# Patient Record
Sex: FEMALE | Race: WHITE | NOT HISPANIC OR LATINO | Employment: OTHER | URBAN - METROPOLITAN AREA
[De-identification: names, ages, dates, MRNs, and addresses within clinical notes are randomized per-mention and may not be internally consistent; named-entity substitution may affect disease eponyms.]

---

## 2017-09-26 ENCOUNTER — APPOINTMENT (OUTPATIENT)
Dept: WOMENS IMAGING | Facility: HOSPITAL | Age: 82
End: 2017-09-26

## 2017-09-26 PROCEDURE — 77063 BREAST TOMOSYNTHESIS BI: CPT | Performed by: RADIOLOGY

## 2017-09-26 PROCEDURE — G0202 SCR MAMMO BI INCL CAD: HCPCS | Performed by: RADIOLOGY

## 2017-09-26 PROCEDURE — MDREVIEWSP: Performed by: RADIOLOGY

## 2018-10-09 ENCOUNTER — APPOINTMENT (OUTPATIENT)
Dept: WOMENS IMAGING | Facility: HOSPITAL | Age: 83
End: 2018-10-09

## 2018-10-09 PROCEDURE — MDREVIEWSP: Performed by: RADIOLOGY

## 2018-10-09 PROCEDURE — 77067 SCR MAMMO BI INCL CAD: CPT | Performed by: RADIOLOGY

## 2018-10-09 PROCEDURE — 77063 BREAST TOMOSYNTHESIS BI: CPT | Performed by: RADIOLOGY

## 2019-07-26 ENCOUNTER — OFFICE VISIT (OUTPATIENT)
Dept: CARDIOLOGY | Facility: CLINIC | Age: 84
End: 2019-07-26

## 2019-07-26 VITALS
WEIGHT: 151 LBS | RESPIRATION RATE: 18 BRPM | DIASTOLIC BLOOD PRESSURE: 80 MMHG | BODY MASS INDEX: 29.64 KG/M2 | SYSTOLIC BLOOD PRESSURE: 144 MMHG | HEART RATE: 52 BPM | HEIGHT: 60 IN | OXYGEN SATURATION: 98 %

## 2019-07-26 DIAGNOSIS — E03.9 ACQUIRED HYPOTHYROIDISM: ICD-10-CM

## 2019-07-26 DIAGNOSIS — R00.2 PALPITATIONS: ICD-10-CM

## 2019-07-26 DIAGNOSIS — R60.0 BILATERAL LEG EDEMA: ICD-10-CM

## 2019-07-26 DIAGNOSIS — R60.0 EDEMA LEG: Primary | ICD-10-CM

## 2019-07-26 PROCEDURE — 93000 ELECTROCARDIOGRAM COMPLETE: CPT | Performed by: INTERNAL MEDICINE

## 2019-07-26 PROCEDURE — 99203 OFFICE O/P NEW LOW 30 MIN: CPT | Performed by: INTERNAL MEDICINE

## 2019-07-26 RX ORDER — LEVOTHYROXINE SODIUM 0.05 MG/1
1 TABLET ORAL DAILY
Refills: 3 | COMMUNITY
Start: 2019-06-27

## 2019-07-26 NOTE — PROGRESS NOTES
Subjective:     Encounter Date:07/26/2019      Patient ID: Jaleesa Govea is a 84 y.o. female.    Chief Complaint:  Chief Complaint   Patient presents with   • Leg Swelling       HPI:  Jaleesa is a very pleasant 84-year-old female with no cardiac history.  She has only history of underlying hypothyroidism for which she takes levothyroxine for thyroid replacement therapy.  She is being referred for evaluation for lower extremity edema.    Her lower extremity edema is not associated with any shortness of breath decrease in physical activity or recent dietary changes.  She reports that her lower extremity edema was noticed in the setting of pretibial tenderness and tingling to touch.  She is now wearing compression stockings.  The edema does resolve with elevation of her legs.  She has no history of peripheral vascular disease.  Of note she also describes palpitations, no heart racing.  Applications occur at rest and seem to resolve with exertion.  There is no associated lightheadedness dizziness and no report of near syncope.  ECG see below.    The following portions of the patient's history were reviewed and updated as appropriate: allergies, current medications, past family history, past medical history, past social history, past surgical history and problem list.    Problem List:  Patient Active Problem List   Diagnosis   • Hypothyroidism   • Bilateral leg edema   • Palpitations       Past Medical History:  Past Medical History:   Diagnosis Date   • Bilateral leg edema 7/26/2019   • Hypothyroidism    • Palpitations 7/26/2019       Past Surgical History:  History reviewed. No pertinent surgical history.    Social History:  Social History     Socioeconomic History   • Marital status:      Spouse name: Not on file   • Number of children: Not on file   • Years of education: Not on file   • Highest education level: Not on file   Tobacco Use   • Smoking status: Former Smoker   • Smokeless tobacco: Never Used  "  Substance and Sexual Activity   • Alcohol use: No     Frequency: Never   • Drug use: No       Allergies:  Allergies   Allergen Reactions   • Penicillins Hives   • Sulfa Antibiotics Confusion       Review of Symptoms:  Constitutional: Patient afebrile no chills or unexpected weight changes  Respiratory: No cough, no wheezing or dyspnea  Cardiovascular: No chest pain, palpitations, dyspnea, orthopnea and no edema  Gastrointestinal: No nausea, vomiting, constipation or diarrhea.  No melena or dark stools    All other systems reviewed and are negative           Objective:         /80 (BP Location: Left arm, Patient Position: Sitting, Cuff Size: Large Adult)   Pulse 52   Resp 18   Ht 152.4 cm (60\")   Wt 68.5 kg (151 lb)   SpO2 98%   BMI 29.49 kg/m²       Physical exam  Constitutional: well-nourished, and appears stated age in no acute distress  PERRL: Conjunctiva clear, no pallor, anicteric  HENMT: normocephalic, normal dentition, no cyanosis or pallor  Neck:no bruits, or thrills and bilateral normal carotid upstroke. Normal jugular venous pressure  Cardiovascular: No parasternal heaves an non-displaced focal PMI. Normal rate and rhythm: no rub, gallop, murmur or click and normal S1 and S2; no lower or upper extremity edema.   Lungs: unlabored, no wheezing with no rales or rhonchi on auscultation.  Extremities: Warm, no clubbing, cyanosis, or edema. Full and equal peripheral pulses in extremities with no bruits appreciated.   Abdomen: soft, non-tender, non-distended  Musculoskeletal: no joint tenderness or swelling and no erythema  Skin: Warm and dry, non-erythematous   Neuro:alert and normal affect. Oriented to time, place and person.        In-Office Procedure(s):    ECG 12 Lead  Date/Time: 7/26/2019 9:45 AM  Performed by: Miguel Foster MD  Authorized by: Miguel Foster MD   Rhythm: sinus rhythm  Comments: Multiple atrial premature contractions with right bundle branch " block            ASCVD RIsk Score::  The ASCVD Risk score (Mileyjose VILLALBA Jr., et al., 2013) failed to calculate for the following reasons:    The 2013 ASCVD risk score is only valid for ages 40 to 79    Recent Radiology:  Imaging Results (most recent)     None          Lab Review:   No visits with results within 2 Month(s) from this visit.   Latest known visit with results is:   Admission on 06/15/2017, Discharged on 06/15/2017   Component Date Value   • Specimen Description: 06/15/2017 RIGHT LOWER LEG    • Special Requests 06/15/2017 NO SPECIAL REQUESTS    • Gram Stain Result 06/15/2017 NO ORGANISMS SEENFEW WBCS SEEN    • Culture 06/15/2017 NO GROWTH 4 DAYS    • Report Status 06/15/2017 06/19/2017 FINAL               Invalid input(s): ALKPO4                        Invalid input(s): LDLCALC                Assessment:          Diagnosis Plan   1. Edema leg  Adult Transthoracic Echo Complete W/ Cont if Necessary Per Protocol    ECG 12 Lead   2. Palpitations     3. Acquired hypothyroidism     4. Bilateral leg edema            Plan:         1. Edema leg is a new complaint to me  Rule out structural heart disease.  - Adult Transthoracic Echo Complete W/ Cont if Necessary Per Protocol; Future    2.  Palpitations this is a new complaint to me  Given her EKG, it is likely that these multiple APCs are the etiology to her palpitations.  She denies any racing heart or symptomatic lightheadedness with her palpitations.  Echo as above to rule out structural heart disease.    3. Chest pain  Likely secondary to palpitations.  Echo will elucidate.    4. Hypothyroidism  May be a controlling factor to her lower extremity edema as well; namely pretibial edema    Greater than 60 minutes of face-to-face time the patient, of which greater than 50% was spent counseling specifically discussing other etiologies for lower extremity edema namely venous insufficiency.    Level of Care:                 Miguel Foster MD  07/26/19  .

## 2019-07-30 ENCOUNTER — HOSPITAL ENCOUNTER (OUTPATIENT)
Dept: CARDIOLOGY | Facility: HOSPITAL | Age: 84
Discharge: HOME | End: 2019-07-30
Admitting: INTERNAL MEDICINE

## 2019-07-30 VITALS — BODY MASS INDEX: 24.11 KG/M2 | WEIGHT: 150 LBS | HEIGHT: 66 IN

## 2019-07-30 DIAGNOSIS — R60.0 EDEMA LEG: ICD-10-CM

## 2019-07-30 PROCEDURE — 93306 TTE W/DOPPLER COMPLETE: CPT

## 2019-07-30 PROCEDURE — 93306 TTE W/DOPPLER COMPLETE: CPT | Performed by: INTERNAL MEDICINE

## 2019-08-01 LAB
BH CV ECHO MEAS - ACS: 1.8 CM
BH CV ECHO MEAS - AO MAX PG (FULL): 2.1 MMHG
BH CV ECHO MEAS - AO MAX PG: 9.3 MMHG
BH CV ECHO MEAS - AO MEAN PG (FULL): 0.95 MMHG
BH CV ECHO MEAS - AO MEAN PG: 3.7 MMHG
BH CV ECHO MEAS - AO ROOT AREA (BSA CORRECTED): 1.7
BH CV ECHO MEAS - AO ROOT AREA: 6.9 CM^2
BH CV ECHO MEAS - AO ROOT DIAM: 3 CM
BH CV ECHO MEAS - AO V2 MAX: 152.4 CM/SEC
BH CV ECHO MEAS - AO V2 MEAN: 87.2 CM/SEC
BH CV ECHO MEAS - AO V2 VTI: 28.5 CM
BH CV ECHO MEAS - AORTIC HR: 90.6 BPM
BH CV ECHO MEAS - AORTIC R-R: 0.66 SEC
BH CV ECHO MEAS - ASC AORTA: 2.3 CM
BH CV ECHO MEAS - AVA(I,A): 2.8 CM^2
BH CV ECHO MEAS - AVA(I,D): 2.8 CM^2
BH CV ECHO MEAS - AVA(V,A): 2.7 CM^2
BH CV ECHO MEAS - AVA(V,D): 2.7 CM^2
BH CV ECHO MEAS - BSA(HAYCOCK): 1.8 M^2
BH CV ECHO MEAS - BSA: 1.8 M^2
BH CV ECHO MEAS - BZI_BMI: 24.2 KILOGRAMS/M^2
BH CV ECHO MEAS - BZI_METRIC_HEIGHT: 167.6 CM
BH CV ECHO MEAS - BZI_METRIC_WEIGHT: 68 KG
BH CV ECHO MEAS - CI(AO): 10 L/MIN/M^2
BH CV ECHO MEAS - CI(LVOT): 4 L/MIN/M^2
BH CV ECHO MEAS - CO(AO): 17.7 L/MIN
BH CV ECHO MEAS - CO(LVOT): 7.1 L/MIN
BH CV ECHO MEAS - EDV(CUBED): 104.6 ML
BH CV ECHO MEAS - EDV(MOD-SP4): 89.2 ML
BH CV ECHO MEAS - EDV(TEICH): 103 ML
BH CV ECHO MEAS - EF(CUBED): 83.9 %
BH CV ECHO MEAS - EF(MOD-BP): 57 %
BH CV ECHO MEAS - EF(MOD-SP4): 57.4 %
BH CV ECHO MEAS - EF(TEICH): 76.9 %
BH CV ECHO MEAS - ESV(CUBED): 16.9 ML
BH CV ECHO MEAS - ESV(MOD-SP4): 38 ML
BH CV ECHO MEAS - ESV(TEICH): 23.8 ML
BH CV ECHO MEAS - FS: 45.6 %
BH CV ECHO MEAS - IVS/LVPW: 0.94
BH CV ECHO MEAS - IVSD: 0.79 CM
BH CV ECHO MEAS - LA DIMENSION(2D): 3.8 CM
BH CV ECHO MEAS - LV DIASTOLIC VOL/BSA (35-75): 50.4 ML/M^2
BH CV ECHO MEAS - LV MASS(C)D: 125.5 GRAMS
BH CV ECHO MEAS - LV MASS(C)DI: 70.9 GRAMS/M^2
BH CV ECHO MEAS - LV MAX PG: 7.2 MMHG
BH CV ECHO MEAS - LV MEAN PG: 2.7 MMHG
BH CV ECHO MEAS - LV SYSTOLIC VOL/BSA (12-30): 21.5 ML/M^2
BH CV ECHO MEAS - LV V1 MAX: 134.4 CM/SEC
BH CV ECHO MEAS - LV V1 MEAN: 71.5 CM/SEC
BH CV ECHO MEAS - LV V1 VTI: 26.1 CM
BH CV ECHO MEAS - LVIDD: 4.7 CM
BH CV ECHO MEAS - LVIDS: 2.6 CM
BH CV ECHO MEAS - LVOT AREA: 3 CM^2
BH CV ECHO MEAS - LVOT DIAM: 2 CM
BH CV ECHO MEAS - LVPWD: 0.84 CM
BH CV ECHO MEAS - MR MAX PG: 125.5 MMHG
BH CV ECHO MEAS - MR MAX VEL: 560.1 CM/SEC
BH CV ECHO MEAS - MV A MAX VEL: 63.7 CM/SEC
BH CV ECHO MEAS - MV DEC SLOPE: 374.7 CM/SEC^2
BH CV ECHO MEAS - MV DEC TIME: 0.21 SEC
BH CV ECHO MEAS - MV E MAX VEL: 78 CM/SEC
BH CV ECHO MEAS - MV E/A: 1.2
BH CV ECHO MEAS - MV MAX PG: 3.2 MMHG
BH CV ECHO MEAS - MV MEAN PG: 1.2 MMHG
BH CV ECHO MEAS - MV V2 MAX: 89.4 CM/SEC
BH CV ECHO MEAS - MV V2 MEAN: 50.9 CM/SEC
BH CV ECHO MEAS - MV V2 VTI: 43.9 CM
BH CV ECHO MEAS - MVA(VTI): 1.8 CM^2
BH CV ECHO MEAS - PA ACC TIME: 0.14 SEC
BH CV ECHO MEAS - PA MAX PG (FULL): 2.5 MMHG
BH CV ECHO MEAS - PA MAX PG: 4.1 MMHG
BH CV ECHO MEAS - PA MEAN PG (FULL): 1 MMHG
BH CV ECHO MEAS - PA MEAN PG: 1.8 MMHG
BH CV ECHO MEAS - PA PR(ACCEL): 15.6 MMHG
BH CV ECHO MEAS - PA V2 MAX: 101.9 CM/SEC
BH CV ECHO MEAS - PA V2 MEAN: 63 CM/SEC
BH CV ECHO MEAS - PA V2 VTI: 19.7 CM
BH CV ECHO MEAS - PULM A REVS DUR: 0.19 SEC
BH CV ECHO MEAS - PULM A REVS VEL: 37.1 CM/SEC
BH CV ECHO MEAS - PULM DIAS VEL: 46.4 CM/SEC
BH CV ECHO MEAS - PULM S/D: 1.3
BH CV ECHO MEAS - PULM SYS VEL: 58.6 CM/SEC
BH CV ECHO MEAS - PVA(I,A): 2.6 CM^2
BH CV ECHO MEAS - PVA(I,D): 2.6 CM^2
BH CV ECHO MEAS - PVA(V,A): 2.2 CM^2
BH CV ECHO MEAS - PVA(V,D): 2.2 CM^2
BH CV ECHO MEAS - QP/QS: 0.66
BH CV ECHO MEAS - RAP SYSTOLE: 3 MMHG
BH CV ECHO MEAS - RV MAX PG: 1.6 MMHG
BH CV ECHO MEAS - RV MEAN PG: 0.81 MMHG
BH CV ECHO MEAS - RV V1 MAX: 63.7 CM/SEC
BH CV ECHO MEAS - RV V1 MEAN: 41 CM/SEC
BH CV ECHO MEAS - RV V1 VTI: 15 CM
BH CV ECHO MEAS - RVDD: 1.7 CM
BH CV ECHO MEAS - RVOT AREA: 3.5 CM^2
BH CV ECHO MEAS - RVOT DIAM: 2.1 CM
BH CV ECHO MEAS - RVSP: 40.2 MMHG
BH CV ECHO MEAS - SI(AO): 110.6 ML/M^2
BH CV ECHO MEAS - SI(CUBED): 49.6 ML/M^2
BH CV ECHO MEAS - SI(LVOT): 44.5 ML/M^2
BH CV ECHO MEAS - SI(MOD-SP4): 29 ML/M^2
BH CV ECHO MEAS - SI(TEICH): 44.7 ML/M^2
BH CV ECHO MEAS - SV(AO): 195.7 ML
BH CV ECHO MEAS - SV(CUBED): 87.7 ML
BH CV ECHO MEAS - SV(LVOT): 78.8 ML
BH CV ECHO MEAS - SV(MOD-SP4): 51.3 ML
BH CV ECHO MEAS - SV(RVOT): 51.9 ML
BH CV ECHO MEAS - SV(TEICH): 79.2 ML
BH CV ECHO MEAS - TR MAX VEL: 304.9 CM/SEC
MAXIMAL PREDICTED HEART RATE: 138 BPM
STRESS TARGET HR: 117 BPM

## 2019-08-02 ENCOUNTER — TELEPHONE (OUTPATIENT)
Dept: CARDIOLOGY | Facility: CLINIC | Age: 84
End: 2019-08-02

## 2019-08-02 NOTE — TELEPHONE ENCOUNTER
LMOM for pt to return my call for results of echo. Study was normal. No significant VHD and heart function is normal. Carolin

## 2019-08-19 ENCOUNTER — TELEPHONE (OUTPATIENT)
Dept: CARDIOLOGY | Facility: CLINIC | Age: 84
End: 2019-08-19

## 2019-08-19 NOTE — TELEPHONE ENCOUNTER
Patient states she is a patient of Dr Foster, Wants to know if he wants her to see another doctor. States she has spoke with you previous but he was out of the office.    [phone]

## 2019-10-10 ENCOUNTER — APPOINTMENT (OUTPATIENT)
Dept: WOMENS IMAGING | Facility: HOSPITAL | Age: 84
End: 2019-10-10

## 2019-10-10 PROCEDURE — 77067 SCR MAMMO BI INCL CAD: CPT | Performed by: RADIOLOGY

## 2019-10-10 PROCEDURE — MDREVIEWSP: Performed by: RADIOLOGY

## 2019-10-10 PROCEDURE — 77063 BREAST TOMOSYNTHESIS BI: CPT | Performed by: RADIOLOGY

## 2019-12-03 ENCOUNTER — HOSPITAL ENCOUNTER (EMERGENCY)
Facility: HOSPITAL | Age: 84
Discharge: HOME | End: 2019-12-03
Attending: EMERGENCY MEDICINE | Admitting: EMERGENCY MEDICINE

## 2019-12-03 ENCOUNTER — HOSPITAL ENCOUNTER (EMERGENCY)
Dept: CARDIOLOGY | Facility: HOSPITAL | Age: 84
Discharge: HOME | End: 2019-12-03

## 2019-12-03 VITALS
HEIGHT: 66 IN | TEMPERATURE: 97 F | SYSTOLIC BLOOD PRESSURE: 146 MMHG | DIASTOLIC BLOOD PRESSURE: 80 MMHG | HEART RATE: 80 BPM | BODY MASS INDEX: 24.06 KG/M2 | OXYGEN SATURATION: 100 % | WEIGHT: 149.69 LBS | RESPIRATION RATE: 20 BRPM

## 2019-12-03 DIAGNOSIS — M79.604 PAIN IN BOTH LOWER EXTREMITIES: ICD-10-CM

## 2019-12-03 DIAGNOSIS — M79.605 PAIN IN BOTH LOWER EXTREMITIES: ICD-10-CM

## 2019-12-03 DIAGNOSIS — M54.9 ACUTE BILATERAL BACK PAIN, UNSPECIFIED BACK LOCATION: Primary | ICD-10-CM

## 2019-12-03 LAB
ALBUMIN SERPL-MCNC: 4.1 G/DL (ref 3.5–5.2)
ALBUMIN/GLOB SERPL: 1 G/DL
ALP SERPL-CCNC: 87 U/L (ref 39–117)
ALT SERPL W P-5'-P-CCNC: 15 U/L (ref 1–33)
ANION GAP SERPL CALCULATED.3IONS-SCNC: 13 MMOL/L (ref 5–15)
AST SERPL-CCNC: 20 U/L (ref 1–32)
BASOPHILS # BLD AUTO: 0 10*3/MM3 (ref 0–0.2)
BASOPHILS NFR BLD AUTO: 0.3 % (ref 0–1.5)
BH CV LOWER ARTERIAL LEFT ABI RATIO: 1.03
BH CV LOWER ARTERIAL LEFT DORSALIS PEDIS SYS MAX: 178 MMHG
BH CV LOWER ARTERIAL LEFT GREAT TOE SYS MAX: 117 MMHG
BH CV LOWER ARTERIAL LEFT POST TIBIAL SYS MAX: 178 MMHG
BH CV LOWER ARTERIAL LEFT TBI RATIO: 0.68
BH CV LOWER ARTERIAL RIGHT ABI RATIO: 1.06
BH CV LOWER ARTERIAL RIGHT DORSALIS PEDIS SYS MAX: 179 MMHG
BH CV LOWER ARTERIAL RIGHT GREAT TOE SYS MAX: 121 MMHG
BH CV LOWER ARTERIAL RIGHT POST TIBIAL SYS MAX: 183 MMHG
BH CV LOWER ARTERIAL RIGHT TBI RATIO: 0.7
BILIRUB SERPL-MCNC: 0.4 MG/DL (ref 0.2–1.2)
BILIRUB UR QL STRIP: NEGATIVE
BUN BLD-MCNC: 16 MG/DL (ref 8–23)
BUN/CREAT SERPL: 17.4 (ref 7–25)
CALCIUM SPEC-SCNC: 9.4 MG/DL (ref 8.6–10.5)
CHLORIDE SERPL-SCNC: 99 MMOL/L (ref 98–107)
CLARITY UR: CLEAR
CO2 SERPL-SCNC: 26 MMOL/L (ref 22–29)
COLOR UR: YELLOW
CREAT BLD-MCNC: 0.92 MG/DL (ref 0.57–1)
DEPRECATED RDW RBC AUTO: 44.6 FL (ref 37–54)
EOSINOPHIL # BLD AUTO: 0 10*3/MM3 (ref 0–0.4)
EOSINOPHIL NFR BLD AUTO: 0.6 % (ref 0.3–6.2)
ERYTHROCYTE [DISTWIDTH] IN BLOOD BY AUTOMATED COUNT: 13.4 % (ref 12.3–15.4)
GFR SERPL CREATININE-BSD FRML MDRD: 58 ML/MIN/1.73
GLOBULIN UR ELPH-MCNC: 4.1 GM/DL
GLUCOSE BLD-MCNC: 111 MG/DL (ref 65–99)
GLUCOSE UR STRIP-MCNC: NEGATIVE MG/DL
HCT VFR BLD AUTO: 42.9 % (ref 34–46.6)
HGB BLD-MCNC: 14.5 G/DL (ref 12–15.9)
HGB UR QL STRIP.AUTO: NEGATIVE
KETONES UR QL STRIP: ABNORMAL
LEUKOCYTE ESTERASE UR QL STRIP.AUTO: NEGATIVE
LYMPHOCYTES # BLD AUTO: 0.8 10*3/MM3 (ref 0.7–3.1)
LYMPHOCYTES NFR BLD AUTO: 14.3 % (ref 19.6–45.3)
MCH RBC QN AUTO: 31.7 PG (ref 26.6–33)
MCHC RBC AUTO-ENTMCNC: 33.7 G/DL (ref 31.5–35.7)
MCV RBC AUTO: 94 FL (ref 79–97)
MONOCYTES # BLD AUTO: 0.5 10*3/MM3 (ref 0.1–0.9)
MONOCYTES NFR BLD AUTO: 8.2 % (ref 5–12)
NEUTROPHILS # BLD AUTO: 4.3 10*3/MM3 (ref 1.7–7)
NEUTROPHILS NFR BLD AUTO: 76.6 % (ref 42.7–76)
NITRITE UR QL STRIP: NEGATIVE
NRBC BLD AUTO-RTO: 0.3 /100 WBC (ref 0–0.2)
PH UR STRIP.AUTO: 6 [PH] (ref 5–8)
PLATELET # BLD AUTO: 216 10*3/MM3 (ref 140–450)
PMV BLD AUTO: 9.1 FL (ref 6–12)
POTASSIUM BLD-SCNC: 4.1 MMOL/L (ref 3.5–5.2)
PROT SERPL-MCNC: 8.2 G/DL (ref 6–8.5)
PROT UR QL STRIP: NEGATIVE
RBC # BLD AUTO: 4.57 10*6/MM3 (ref 3.77–5.28)
SODIUM BLD-SCNC: 138 MMOL/L (ref 136–145)
SP GR UR STRIP: 1.02 (ref 1–1.03)
UPPER ARTERIAL LEFT ARM BRACHIAL SYS MAX: 165 MMHG
UPPER ARTERIAL RIGHT ARM BRACHIAL SYS MAX: 172 MMHG
UROBILINOGEN UR QL STRIP: ABNORMAL
WBC NRBC COR # BLD: 5.7 10*3/MM3 (ref 3.4–10.8)

## 2019-12-03 PROCEDURE — 93922 UPR/L XTREMITY ART 2 LEVELS: CPT

## 2019-12-03 PROCEDURE — 81003 URINALYSIS AUTO W/O SCOPE: CPT | Performed by: EMERGENCY MEDICINE

## 2019-12-03 PROCEDURE — 99283 EMERGENCY DEPT VISIT LOW MDM: CPT

## 2019-12-03 PROCEDURE — 85025 COMPLETE CBC W/AUTO DIFF WBC: CPT | Performed by: EMERGENCY MEDICINE

## 2019-12-03 PROCEDURE — 80053 COMPREHEN METABOLIC PANEL: CPT | Performed by: EMERGENCY MEDICINE

## 2019-12-03 NOTE — ED PROVIDER NOTES
Subjective   History of Present Illness  Context: 84-year-old female presents with complaints of weakness.  She states she had pain across the middle of her back during the night.  It is worse if she moves or tries to rollover.  She states she is just not felt as well the last few days.  She had an abscess on her back was placed on Keflex for her.  She states with warm compresses it did pop and drain.  She states she has not felt as well since she has been on the antibiotic.  She has had no cough no chest pain or shortness of breath.  No vomit no diarrhea no dysuria.  She complains of soreness in her legs if they are touched.  She states her doctor had referred her to a vascular surgeon but she has not seen yet.  She reports no fever.  She does not complain of any significant abdominal pain at this time.  States he has just a little bit of soreness.  Location: Abdomen back legs  Quality: Soreness  Duration: Back today other soreness more chronic  Timing: Back pain improved at this time  Severity: Severe earlier mild now   Modifying factors: Worse with movement.  States MARÍA hose helped her leg symptoms  Associated signs and symptoms: On Keflex for abscess to back    Review of Systems  For fever chest pain shortness of breath vomiting diarrhea dysuria  Past Medical History:   Diagnosis Date   • Bilateral leg edema 7/26/2019   • Hypothyroidism    • Palpitations 7/26/2019       Allergies   Allergen Reactions   • Penicillins Hives   • Sulfa Antibiotics Confusion       No past surgical history on file.  Hip replacement bilateral knee replacements  Family History   Problem Relation Age of Onset   • Coronary artery disease Brother        Social History     Socioeconomic History   • Marital status:      Spouse name: Not on file   • Number of children: Not on file   • Years of education: Not on file   • Highest education level: Not on file   Tobacco Use   • Smoking status: Former Smoker   • Smokeless tobacco: Never  Used   Substance and Sexual Activity   • Alcohol use: No     Frequency: Never   • Drug use: No     Prior to Admission medications    Medication Sig Start Date End Date Taking? Authorizing Provider   levothyroxine (SYNTHROID, LEVOTHROID) 50 MCG tablet Take 1 tablet by mouth Daily. 6/27/19   Provider, Ramu, MD   Keflex        Objective   Physical Exam   ,84-year-old female awake alert.  Generally well-developed well-nourished.  Pupils equal round react light.  Oropharynx mucous membranes moist chest clear equal breath sounds.  Cardiovascular regular rhythm.  Abdomen soft without localizing tenderness mass rebound or guarding.  Examination of back no CVA tenderness.  She has no difficulty with movement.  Examination of legs she has some discoloration to the lower legs consistent with some venous insufficiency.  There is no varicose veins there is no edema.  She is neurovascular grossly intact distally.  Examination of back abscess appears to have healed without complication  Patient does complain that she has pain with light touch of legs bilaterally.  She has no significant varicose veins.  She has negative Homans.  No calf edema.  Procedures           ED Course      Results for orders placed or performed during the hospital encounter of 12/03/19   Comprehensive Metabolic Panel   Result Value Ref Range    Glucose 111 (H) 65 - 99 mg/dL    BUN 16 8 - 23 mg/dL    Creatinine 0.92 0.57 - 1.00 mg/dL    Sodium 138 136 - 145 mmol/L    Potassium 4.1 3.5 - 5.2 mmol/L    Chloride 99 98 - 107 mmol/L    CO2 26.0 22.0 - 29.0 mmol/L    Calcium 9.4 8.6 - 10.5 mg/dL    Total Protein 8.2 6.0 - 8.5 g/dL    Albumin 4.10 3.50 - 5.20 g/dL    ALT (SGPT) 15 1 - 33 U/L    AST (SGOT) 20 1 - 32 U/L    Alkaline Phosphatase 87 39 - 117 U/L    Total Bilirubin 0.4 0.2 - 1.2 mg/dL    eGFR Non African Amer 58 (L) >60 mL/min/1.73    Globulin 4.1 gm/dL    A/G Ratio 1.0 g/dL    BUN/Creatinine Ratio 17.4 7.0 - 25.0    Anion Gap 13.0 5.0 - 15.0  mmol/L   Urinalysis With Microscopic If Indicated (No Culture) - Urine, Clean Catch   Result Value Ref Range    Color, UA Yellow Yellow, Straw    Appearance, UA Clear Clear    pH, UA 6.0 5.0 - 8.0    Specific Gravity, UA 1.022 1.005 - 1.030    Glucose, UA Negative Negative    Ketones, UA Trace (A) Negative    Bilirubin, UA Negative Negative    Blood, UA Negative Negative    Protein, UA Negative Negative    Leuk Esterase, UA Negative Negative    Nitrite, UA Negative Negative    Urobilinogen, UA 0.2 E.U./dL 0.2 - 1.0 E.U./dL   CBC Auto Differential   Result Value Ref Range    WBC 5.70 3.40 - 10.80 10*3/mm3    RBC 4.57 3.77 - 5.28 10*6/mm3    Hemoglobin 14.5 12.0 - 15.9 g/dL    Hematocrit 42.9 34.0 - 46.6 %    MCV 94.0 79.0 - 97.0 fL    MCH 31.7 26.6 - 33.0 pg    MCHC 33.7 31.5 - 35.7 g/dL    RDW 13.4 12.3 - 15.4 %    RDW-SD 44.6 37.0 - 54.0 fl    MPV 9.1 6.0 - 12.0 fL    Platelets 216 140 - 450 10*3/mm3    Neutrophil % 76.6 (H) 42.7 - 76.0 %    Lymphocyte % 14.3 (L) 19.6 - 45.3 %    Monocyte % 8.2 5.0 - 12.0 %    Eosinophil % 0.6 0.3 - 6.2 %    Basophil % 0.3 0.0 - 1.5 %    Neutrophils, Absolute 4.30 1.70 - 7.00 10*3/mm3    Lymphocytes, Absolute 0.80 0.70 - 3.10 10*3/mm3    Monocytes, Absolute 0.50 0.10 - 0.90 10*3/mm3    Eosinophils, Absolute 0.00 0.00 - 0.40 10*3/mm3    Basophils, Absolute 0.00 0.00 - 0.20 10*3/mm3    nRBC 0.3 (H) 0.0 - 0.2 /100 WBC   Doppler Ankle Brachial Index Single Level CAR   Result Value Ref Range    RIGHT DORSALIS PEDIS SYS  mmHg    RIGHT POST TIBIAL SYS  mmHg    RIGHT GREAT TOE SYS  mmHg    RIGHT JAS RATIO 1.06     RIGHT TBI RATIO 0.7     LEFT DORSALIS PEDIS SYS  mmHg    LEFT POST TIBIAL SYS  mmHg    LEFT GREAT TOE SYS  mmHg    LEFT JAS RATIO 1.03     LEFT TBI RATIO 0.68     Upper arterial right arm brachial sys max 172 mmHg    Upper arterial left arm brachial sys max 165 mmHg     No radiology results for the last day  Medications - No data to  "display  /95   Pulse 87   Temp 97.8 °F (36.6 °C) (Oral)   Resp 19   Ht 167.6 cm (66\")   Wt 67.9 kg (149 lb 11.1 oz)   SpO2 99%   BMI 24.16 kg/m²                MDM  Chart review: Patient had echocardiogram earlier this year that showed mild LV diastolic dysfunction.  LV systolic function was noted to be normal.  Comorbidity: Joint replacements hypothyroid  Differential: UTI, musculoskeletal pain, no evidence of intra-abdominal abnormality based on benign physical exam.  Peripheral vascular disease  My EKG interpretation:   Lab: No significant laboratory abnormality  Radiology: Ankle-brachial indices found to be normal  Discussion/treatment: Patient's findings were discussed with her.  She has unremarkable findings at this time.  She did not desire any medication for pain states would use Tylenol and Aleve.  Advised to follow-up with her PMD return new or worsening symptoms.  The pain in her legs being sensitive to light touch sounds like she has some neuropathic pain.    Final diagnoses:   Acute bilateral back pain, unspecified back location   Pain in both lower extremities             Gabriel Rubio MD  12/03/19 1201    Gabriel Rubio MD  12/03/19 1202  "

## 2019-12-03 NOTE — ED NOTES
Pt reports that she was seen at [facility] for a skin infection after a friend had popped a white bump on her back that then formed a knot and she was placed on 10 days of antibiotics, this morning she woke up with very low back pain denies urinary s/sx says that utis are common for her and she usually does not have s/sx.     Reports general weakness and severe lower back pain  States she has chronic leg pain and was referred to a vascular surgeon but she has not went because her luis hoses have been working well. Pt presents with bilateral leg soreness with palpitated   Geri Herron, RN  12/03/19 1038       Geri Herron RN  12/03/19 1047

## 2021-03-19 ENCOUNTER — HOSPITAL ENCOUNTER (EMERGENCY)
Facility: HOSPITAL | Age: 86
Discharge: HOME | End: 2021-03-19
Attending: EMERGENCY MEDICINE | Admitting: EMERGENCY MEDICINE

## 2021-03-19 VITALS
HEIGHT: 66 IN | TEMPERATURE: 97.9 F | SYSTOLIC BLOOD PRESSURE: 145 MMHG | WEIGHT: 153.22 LBS | OXYGEN SATURATION: 98 % | BODY MASS INDEX: 24.62 KG/M2 | RESPIRATION RATE: 16 BRPM | HEART RATE: 64 BPM | DIASTOLIC BLOOD PRESSURE: 78 MMHG

## 2021-03-19 DIAGNOSIS — S81.812A NONINFECTED SKIN TEAR OF LEFT LOWER EXTREMITY, INITIAL ENCOUNTER: Primary | ICD-10-CM

## 2021-03-19 PROCEDURE — 99282 EMERGENCY DEPT VISIT SF MDM: CPT

## 2021-03-19 NOTE — DISCHARGE INSTRUCTIONS
Elevate your left leg today  Tylenol as needed for discomfort  Watch for evidence of infection  Glue will gradually flake off

## 2021-03-19 NOTE — ED NOTES
Presents with skin tear to left shin. States she was getting something on the washer and a stool fell on her leg.     Reina Smith, RN  03/19/21 0726

## 2021-03-19 NOTE — ED PROVIDER NOTES
Subjective   85-year-old female sustained a laceration to her left lower extremity when it was hit with a plastic chair while she was doing laundry.  She reports a large skin flap.  She reports no decrease in sensation or circulation.  The patient denies other injuries.  Patient states her tetanus status was up-to-date as she had a Tdap vaccination so she could be around her grandchildren          Review of Systems   Musculoskeletal: Positive for arthralgias. Negative for back pain and neck pain.   Skin: Positive for wound.   All other systems reviewed and are negative.      Past Medical History:   Diagnosis Date   • Bilateral leg edema 7/26/2019   • History of echocardiogram 07/30/2019    EF 57%, Mild MR/TR/AR/AR, Grade 2 diastolic dysfunction   • Hypothyroidism    • Palpitations 7/26/2019       Allergies   Allergen Reactions   • Sulfamethoxazole-Trimethoprim Anaphylaxis   • Trimethoprim Other (See Comments)     UNCONSCIOUS    • Cephalexin Other (See Comments)     lethargic   • Nitrofurantoin Dizziness     Feeling faint   • Penicillins Hives   • Sulfa Antibiotics Confusion   • Morphine Nausea Only     Severe nausea   • Warfarin Itching and Rash     ITCHING          History reviewed. No pertinent surgical history.    Family History   Problem Relation Age of Onset   • Coronary artery disease Brother        Social History     Socioeconomic History   • Marital status:      Spouse name: Not on file   • Number of children: Not on file   • Years of education: Not on file   • Highest education level: Not on file   Tobacco Use   • Smoking status: Former Smoker   • Smokeless tobacco: Never Used   Substance and Sexual Activity   • Alcohol use: No   • Drug use: No           Objective   Physical Exam  Nontoxic Jakin Coma Scale 15  Left lower extremity neurovascular intact with normal cap refill no for range of motion intact there is a V-shaped large skin flap measuring 5 cm along the long axis and and 2 cm along the  shorter axis.  There is no evidence of compartment syndrome patient is neurovascular intact Crowell's test is normal  Procedures       The wound was prepped with chlorhexidine irrigated with saline tissue adhesive was used to tack down the flap.    ED Course                                           MDM  Number of Diagnoses or Management Options  Risk of Complications, Morbidity, and/or Mortality  Presenting problems: high  Diagnostic procedures: high  Management options: high  General comments: Patient was advised on wound care instructions at home.  She will elevate the leg today.  The patient was stable at discharge and vocalized understanding of discharge instructions and warnings        Final diagnoses:   Noninfected skin tear of left lower extremity, initial encounter       ED Disposition  ED Disposition     ED Disposition Condition Comment    Discharge Stable           Mariluz Barajas MD  [address] [place] [address]  [phone]               Medication List      No changes were made to your prescriptions during this visit.          Lenin Forde MD  03/19/21 7084

## 2021-03-20 ENCOUNTER — HOSPITAL ENCOUNTER (EMERGENCY)
Facility: HOSPITAL | Age: 86
Discharge: HOME | End: 2021-03-20
Admitting: EMERGENCY MEDICINE

## 2021-03-20 VITALS
WEIGHT: 152.12 LBS | SYSTOLIC BLOOD PRESSURE: 139 MMHG | DIASTOLIC BLOOD PRESSURE: 80 MMHG | OXYGEN SATURATION: 100 % | HEART RATE: 85 BPM | HEIGHT: 66 IN | RESPIRATION RATE: 18 BRPM | TEMPERATURE: 97.9 F | BODY MASS INDEX: 24.45 KG/M2

## 2021-03-20 DIAGNOSIS — S81.812A SKIN TEAR OF LEFT LOWER LEG WITHOUT COMPLICATION, INITIAL ENCOUNTER: Primary | ICD-10-CM

## 2021-03-20 DIAGNOSIS — Z51.89 VISIT FOR WOUND CHECK: ICD-10-CM

## 2021-03-20 PROCEDURE — 99282 EMERGENCY DEPT VISIT SF MDM: CPT

## 2021-03-20 PROCEDURE — 99283 EMERGENCY DEPT VISIT LOW MDM: CPT

## 2021-03-20 NOTE — ED NOTES
Dry sterile nonadherent dressing applied covered with large tegaderm and wrapped in eileen. Pt tolerated well no active bleeding noted     Jennifer Nation RN  03/20/21 4307

## 2021-03-20 NOTE — ED NOTES
Pt was seen here yesterday in ED for skin tear of left lower leg. Presents today with c/o wound bleeding and requests wound be re- evaluated.     Jennifer Nation RN  03/20/21 9689

## 2021-03-20 NOTE — ED PROVIDER NOTES
Subjective   History:  Patient is an 85-year-old female who presents to the ER for a wound check.  Patient was seen here yesterday for a large skin tear.  This morning when she went to shower and took off the dressing the dressing tore some of the skin back open and she was concerned about the bleeding.  Denies any fevers chills nausea vomiting denies any significant pain.    Onset: 1 day  Location: Left lower extremity  Duration: Constant  Character: Skin tear with bleeding  Aggravating/Alleviating factors: None  Radiation none  Severity: Moderate            Review of Systems   Constitutional: Negative for chills, diaphoresis, fatigue and fever.   Respiratory: Negative for cough, choking and shortness of breath.    Cardiovascular: Negative for chest pain and palpitations.   Gastrointestinal: Negative for abdominal pain, nausea and vomiting.   Genitourinary: Negative.    Musculoskeletal: Negative.    Skin: Positive for wound. Negative for color change, pallor and rash.   Neurological: Negative.    Psychiatric/Behavioral: Negative.        Past Medical History:   Diagnosis Date   • Bilateral leg edema 7/26/2019   • History of echocardiogram 07/30/2019    EF 57%, Mild MR/TR/AR/AR, Grade 2 diastolic dysfunction   • Hypothyroidism    • Palpitations 7/26/2019       Allergies   Allergen Reactions   • Sulfamethoxazole-Trimethoprim Anaphylaxis   • Trimethoprim Other (See Comments)     UNCONSCIOUS    • Cephalexin Other (See Comments)     lethargic   • Nitrofurantoin Dizziness     Feeling faint   • Penicillins Hives   • Sulfa Antibiotics Confusion   • Morphine Nausea Only     Severe nausea   • Warfarin Itching and Rash     ITCHING          No past surgical history on file.    Family History   Problem Relation Age of Onset   • Coronary artery disease Brother        Social History     Socioeconomic History   • Marital status:      Spouse name: Not on file   • Number of children: Not on file   • Years of education: Not on  file   • Highest education level: Not on file   Tobacco Use   • Smoking status: Former Smoker   • Smokeless tobacco: Never Used   Substance and Sexual Activity   • Alcohol use: No   • Drug use: No           Objective   Physical Exam  Vitals and nursing note reviewed.   Constitutional:       Appearance: She is well-developed.   HENT:      Head: Normocephalic and atraumatic.      Nose: Nose normal.   Eyes:      Pupils: Pupils are equal, round, and reactive to light.   Pulmonary:      Effort: Pulmonary effort is normal.   Musculoskeletal:         General: Normal range of motion.      Cervical back: Normal range of motion.   Skin:     General: Skin is warm and dry.      Comments: Left anterior lower leg has large skin tear approximately 2x5.5cm. Very minimal active bleeding noted. Dried skin glue noted. Caprefill normal. Pulses 2+ in LLE   Neurological:      General: No focal deficit present.      Mental Status: She is alert and oriented to person, place, and time. Mental status is at baseline.   Psychiatric:         Mood and Affect: Mood normal.         Behavior: Behavior normal.         Thought Content: Thought content normal.         Judgment: Judgment normal.         Procedures           ED Course          No radiology results for the last day  Labs Reviewed - No data to display  Medications - No data to display                                    MDM  Number of Diagnoses or Management Options  Skin tear of left lower leg without complication, initial encounter  Visit for wound check  Diagnosis management comments: I examined the patient using the appropriate personal protective equipment.      DISPOSITION:   Chart Review:  Comorbidity:  has a past medical history of Bilateral leg edema (7/26/2019), History of echocardiogram (07/30/2019), Hypothyroidism, and Palpitations (7/26/2019).    Imaging: Was interpreted by physician and reviewed by myself:  No radiology results for the last  day    Disposition/Treatment:    Patient is a 85-year-old female who presents to the ER for a recheck of a skin tear.  This appears stable no signs of infection with no erythema or edema noted around the incision.  This was redressed with a nonadhesive dressing and patient was given extra to go home with.  She was stable at time of discharge return precaution follow-up return provided she was stable and in agreement with plan.    Patient Progress  Patient progress: stable      Final diagnoses:   Skin tear of left lower leg without complication, initial encounter   Visit for wound check       ED Disposition  ED Disposition     ED Disposition Condition Comment    Discharge Stable           Mariluz Barajas MD  [address] [address]  [phone]    Schedule an appointment as soon as possible for a visit in 3 days  For wound re-check         Medication List      No changes were made to your prescriptions during this visit.          Jennifer Hayward PA-C  03/20/21 4909

## 2021-03-20 NOTE — DISCHARGE INSTRUCTIONS
Return to the ER for any worsening symptoms follow-up with your primary care provider for wound recheck in 3 to 5 days.  May wash with antibacterial soap twice daily.  Otherwise keep clean dry and covered for the next 48 hours.

## 2021-03-20 NOTE — ED NOTES
Patient presents to ED with reports that she was seen yesterday for a wound on her left leg that had liquid bandage applied yesterday in the ED. She was told to remove the bandage today and when she attempted to, she reports that the wound began to bleed again. Bleeding is controlled in triage.      Alla Christopher, RN  03/20/21 0900

## 2021-04-07 ENCOUNTER — OFFICE VISIT (OUTPATIENT)
Dept: WOUND CARE | Facility: HOSPITAL | Age: 86
End: 2021-04-07

## 2021-04-07 DIAGNOSIS — L97.929 CHRONIC VENOUS HYPERTENSION (IDIOPATHIC) WITH ULCER OF LEFT LOWER EXTREMITY (HCC): ICD-10-CM

## 2021-04-07 DIAGNOSIS — I87.312 CHRONIC VENOUS HYPERTENSION (IDIOPATHIC) WITH ULCER OF LEFT LOWER EXTREMITY (HCC): ICD-10-CM

## 2021-04-07 DIAGNOSIS — L97.822 NON-PRESSURE CHRONIC ULCER OF OTHER PART OF LEFT LOWER LEG WITH FAT LAYER EXPOSED (HCC): ICD-10-CM

## 2021-04-07 PROCEDURE — 11045 DBRDMT SUBQ TISS EACH ADDL: CPT | Performed by: PODIATRIST

## 2021-04-07 PROCEDURE — 11042 DBRDMT SUBQ TIS 1ST 20SQCM/<: CPT | Performed by: PODIATRIST

## 2021-04-09 ENCOUNTER — OFFICE VISIT (OUTPATIENT)
Dept: WOUND CARE | Facility: HOSPITAL | Age: 86
End: 2021-04-09

## 2021-04-14 ENCOUNTER — LAB REQUISITION (OUTPATIENT)
Dept: LAB | Facility: HOSPITAL | Age: 86
End: 2021-04-14

## 2021-04-14 ENCOUNTER — OFFICE VISIT (OUTPATIENT)
Dept: WOUND CARE | Facility: HOSPITAL | Age: 86
End: 2021-04-14

## 2021-04-14 DIAGNOSIS — I87.312 CHRONIC VENOUS HYPERTENSION (IDIOPATHIC) WITH ULCER OF LEFT LOWER EXTREMITY (CODE) (HCC): ICD-10-CM

## 2021-04-14 DIAGNOSIS — L97.822 NON-PRESSURE CHRONIC ULCER OF OTHER PART OF LEFT LOWER LEG WITH FAT LAYER EXPOSED (HCC): ICD-10-CM

## 2021-04-14 DIAGNOSIS — L03.116 CELLULITIS OF LEFT LOWER LEG: ICD-10-CM

## 2021-04-14 PROCEDURE — 11042 DBRDMT SUBQ TIS 1ST 20SQCM/<: CPT | Performed by: PODIATRIST

## 2021-04-14 PROCEDURE — 99213 OFFICE O/P EST LOW 20 MIN: CPT | Performed by: PODIATRIST

## 2021-04-14 PROCEDURE — 87205 SMEAR GRAM STAIN: CPT | Performed by: SURGERY

## 2021-04-14 PROCEDURE — 87070 CULTURE OTHR SPECIMN AEROBIC: CPT | Performed by: SURGERY

## 2021-04-16 ENCOUNTER — OFFICE VISIT (OUTPATIENT)
Dept: WOUND CARE | Facility: HOSPITAL | Age: 86
End: 2021-04-16

## 2021-04-19 LAB
BACTERIA SPEC AEROBE CULT: NORMAL
GRAM STN SPEC: NORMAL
GRAM STN SPEC: NORMAL

## 2021-04-21 ENCOUNTER — OFFICE VISIT (OUTPATIENT)
Dept: WOUND CARE | Facility: HOSPITAL | Age: 86
End: 2021-04-21

## 2021-04-21 DIAGNOSIS — I87.312 CHRONIC VENOUS HYPERTENSION (IDIOPATHIC) WITH ULCER OF LEFT LOWER EXTREMITY (CODE) (HCC): ICD-10-CM

## 2021-04-21 DIAGNOSIS — L97.822 NON-PRESSURE CHRONIC ULCER OF OTHER PART OF LEFT LOWER LEG WITH FAT LAYER EXPOSED (HCC): ICD-10-CM

## 2021-04-21 PROCEDURE — 99213 OFFICE O/P EST LOW 20 MIN: CPT | Performed by: PODIATRIST

## 2021-04-23 ENCOUNTER — OFFICE VISIT (OUTPATIENT)
Dept: WOUND CARE | Facility: HOSPITAL | Age: 86
End: 2021-04-23

## 2021-04-28 ENCOUNTER — OFFICE VISIT (OUTPATIENT)
Dept: WOUND CARE | Facility: HOSPITAL | Age: 86
End: 2021-04-28

## 2021-04-28 DIAGNOSIS — I87.312 CHRONIC VENOUS HYPERTENSION (IDIOPATHIC) WITH ULCER OF LEFT LOWER EXTREMITY (CODE) (HCC): ICD-10-CM

## 2021-04-28 DIAGNOSIS — L97.822 NON-PRESSURE CHRONIC ULCER OF OTHER PART OF LEFT LOWER LEG WITH FAT LAYER EXPOSED (HCC): ICD-10-CM

## 2021-04-28 PROCEDURE — 99213 OFFICE O/P EST LOW 20 MIN: CPT | Performed by: PODIATRIST

## 2021-04-30 ENCOUNTER — OFFICE VISIT (OUTPATIENT)
Dept: WOUND CARE | Facility: HOSPITAL | Age: 86
End: 2021-04-30

## 2021-05-05 ENCOUNTER — OFFICE VISIT (OUTPATIENT)
Dept: WOUND CARE | Facility: HOSPITAL | Age: 86
End: 2021-05-05

## 2021-05-05 DIAGNOSIS — I87.312 CHRONIC VENOUS HYPERTENSION (IDIOPATHIC) WITH ULCER OF LEFT LOWER EXTREMITY (CODE) (HCC): ICD-10-CM

## 2021-05-05 DIAGNOSIS — L97.822 NON-PRESSURE CHRONIC ULCER OF OTHER PART OF LEFT LOWER LEG WITH FAT LAYER EXPOSED (HCC): ICD-10-CM

## 2021-05-05 PROCEDURE — 99212 OFFICE O/P EST SF 10 MIN: CPT | Performed by: PODIATRIST

## 2021-05-05 PROCEDURE — G0463 HOSPITAL OUTPT CLINIC VISIT: HCPCS

## 2021-05-07 ENCOUNTER — TELEPHONE (OUTPATIENT)
Dept: ORTHOPEDIC SURGERY | Facility: CLINIC | Age: 86
End: 2021-05-07

## 2021-05-07 NOTE — TELEPHONE ENCOUNTER
Caller: KALEE COREAS     Relationship to patient: SELF     Best call back number: [phone]     Type of visit: FOLLOW UP AFTER AFTER BEING SEEN IN WOUND CARE     Requested date: AROUND THE WEEK OF 05/24/21    Additional notes: PATIENT STATES THAT SHE HAS BEEN SEEN IN WOUND CARE WITH DR BAINS AND THAT DR BAINS REQUESTED FOR HER TO SCHEDULE A FOLLOW UP IN HIS PODIATRY OFFICE, PATIENT STATES THAT IT IS A 2 WEEK FOLLOW UP, LAST SEEN 05/05/21 AND PATIENT ALSO HAS CATARACT SX COMING UP SO SHE CAN NOT COME IN UNTIL THE WEEK OF THE 24TH

## 2021-05-26 ENCOUNTER — OFFICE VISIT (OUTPATIENT)
Dept: PODIATRY | Facility: CLINIC | Age: 86
End: 2021-05-26

## 2021-05-26 VITALS
SYSTOLIC BLOOD PRESSURE: 164 MMHG | BODY MASS INDEX: 24.27 KG/M2 | HEIGHT: 66 IN | WEIGHT: 151 LBS | HEART RATE: 84 BPM | DIASTOLIC BLOOD PRESSURE: 82 MMHG

## 2021-05-26 DIAGNOSIS — S81.812D LACERATION OF LEFT LOWER EXTREMITY, SUBSEQUENT ENCOUNTER: Primary | ICD-10-CM

## 2021-05-26 PROCEDURE — 99212 OFFICE O/P EST SF 10 MIN: CPT | Performed by: PODIATRIST

## 2021-05-26 RX ORDER — ASCORBIC ACID 500 MG
500 TABLET ORAL DAILY
COMMUNITY
End: 2022-08-03

## 2021-05-26 RX ORDER — DIPHENOXYLATE HYDROCHLORIDE AND ATROPINE SULFATE 2.5; .025 MG/1; MG/1
1 TABLET ORAL DAILY
COMMUNITY

## 2021-05-26 RX ORDER — LANOLIN ALCOHOL/MO/W.PET/CERES
1 CREAM (GRAM) TOPICAL
COMMUNITY

## 2021-05-26 RX ORDER — LEVOTHYROXINE SODIUM 50 UG/1
50 CAPSULE ORAL
COMMUNITY
End: 2022-04-16 | Stop reason: HOSPADM

## 2021-05-26 RX ORDER — OMEGA-3S/DHA/EPA/FISH OIL/D3 300MG-1000
400 CAPSULE ORAL DAILY
COMMUNITY

## 2021-05-26 RX ORDER — BENZOCAINE 20 %
GEL (GRAM) MUCOUS MEMBRANE
COMMUNITY

## 2021-05-26 RX ORDER — OMEGA-3/DHA/EPA/FISH OIL 300-1000MG
CAPSULE ORAL DAILY
COMMUNITY
End: 2022-08-03

## 2021-05-27 NOTE — PROGRESS NOTES
05/26/2021  Foot and Ankle Surgery - Established Patient/Follow-up  Provider: Dr. Harsha Coronado DPM  Location: [facility]    Subjective:  Jaleesa Govea is a 85 y.o. female.     Chief Complaint   Patient presents with   • Left Ankle - Wound Check       HPI: Patient follows up after recent discharge from the wound care center.  She states that she is doing quite well.  The laceration site to the left lower extremity is well-healed.  She has continued to apply a bandage to protect from rubbing on her close but has not noticed any drainage or other concerning features.  Patient is very happy with her overall improvement.    Allergies   Allergen Reactions   • Sulfamethoxazole-Trimethoprim Anaphylaxis   • Trimethoprim Other (See Comments)     UNCONSCIOUS    • Cephalexin Other (See Comments)     lethargic   • Nitrofurantoin Dizziness     Feeling faint   • Penicillins Hives   • Sulfa Antibiotics Confusion   • Morphine Nausea Only     Severe nausea   • Warfarin Itching and Rash     ITCHING          Current Outpatient Medications on File Prior to Visit   Medication Sig Dispense Refill   • ascorbic acid (VITAMIN C) 500 MG tablet Take 500 mg by mouth Daily.     • Calcium Carbonate 1500 (600 Ca) MG tablet Take 600 mg by mouth Daily.     • cholecalciferol (VITAMIN D3) 10 MCG (400 UNIT) tablet Take 400 Units by mouth Daily.     • fish oil-omega-3 fatty acids 1000 MG capsule Take  by mouth Daily.     • Flaxseed, Linseed, 1000 MG capsule Take  by mouth.     • glucosamine-chondroitin 500-400 MG per tablet Take 1 tablet by mouth.     • levothyroxine (SYNTHROID, LEVOTHROID) 50 MCG tablet Take 1 tablet by mouth Daily.  3   • levothyroxine sodium (TIROSINT) 50 MCG capsule Take 50 mcg by mouth.     • multivitamin (multivitamin) tablet tablet Take 1 tablet by mouth Daily.     • zinc sulfate (ZINCATE) 50 MG capsule Take 220 mg by mouth Daily.       No current facility-administered medications on file prior to visit.  "      Objective   /82   Pulse 84   Ht 167.6 cm (66\")   Wt 68.5 kg (151 lb)   BMI 24.37 kg/m²     Foot/Ankle Exam:       General:   Appearance: appears stated age and healthy    Orientation: AAOx3    Affect: appropriate    Gait: unimpaired      VASCULAR      Left Foot Vascularity   Normal vascular exam    Dorsalis pedis:  2+  Posterior tibial:  2+  Skin Temperature: warm    Edema Gradin+  CFT:  < 3 seconds  Pedal Hair Growth:  Present  Varicosities: none        NEUROLOGIC     Left Foot Neurologic   Light touch sensation:  Normal  Hot/cold sensation: normal    Achilles reflex:  2+     MUSCULOSKELETAL      Left Foot Musculoskeletal   Ecchymosis:  None  Tenderness: none    Arch:  Normal     MUSCLE STRENGTH     Left Foot Muscle Strength   Normal strength    Foot dorsiflexion:  5  Foot plantar flexion:  5  Foot inversion:  5  Foot eversion:  5     DERMATOLOGIC     Left Foot Dermatologic   Skin: skin intact       TESTS     Left Foot Tests   Anterior drawer: negative    Varus tilt: negative        Assessment/Plan   Diagnoses and all orders for this visit:    1. Laceration of left lower extremity, subsequent encounter (Primary)      Laceration to the pretibial region of the left lower extremity remains well-healed.  She continues to have mild swelling but no complicating features.  I have no further instructions or restrictions for her at this time.  She may proceed with normal daily activities as tolerated.  Patient is to see me on an as-needed basis.    No orders of the defined types were placed in this encounter.         Note is dictated utilizing voice recognition software. Unfortunately this leads to occasional typographical errors. I apologize in advance if the situation occurs. If questions occur please do not hesitate to call our office.  "

## 2021-06-17 ENCOUNTER — OFFICE VISIT (OUTPATIENT)
Dept: PODIATRY | Facility: CLINIC | Age: 86
End: 2021-06-17

## 2021-06-17 VITALS
WEIGHT: 151 LBS | DIASTOLIC BLOOD PRESSURE: 88 MMHG | HEIGHT: 66 IN | BODY MASS INDEX: 24.27 KG/M2 | HEART RATE: 81 BPM | SYSTOLIC BLOOD PRESSURE: 159 MMHG

## 2021-06-17 DIAGNOSIS — I87.322 CHRONIC VENOUS HYPERTENSION WITH INFLAMMATION, LEFT: Primary | ICD-10-CM

## 2021-06-17 PROCEDURE — 99213 OFFICE O/P EST LOW 20 MIN: CPT | Performed by: PODIATRIST

## 2021-06-17 NOTE — PROGRESS NOTES
06/17/2021  Foot and Ankle Surgery - Established Patient/Follow-up  Provider: Dr. Harsha Coronado DPM  Location: [facility]    Subjective:  Jaleesa Govea is a 85 y.o. female.     Chief Complaint   Patient presents with   • Left Lower Leg - Wound Check       HPI: Patient returns for new issue involving her left lower extremity.  She states that she was watering some flowers and dropped a hose landing on the anterior aspect of her left lower leg.  She did notice a small blood blister which appears to be quite stable.  She states that the lesion has not changed dramatically.  She has not had any redness, drainage, or other complicating features.  Injury occurred 1 week ago    Allergies   Allergen Reactions   • Sulfamethoxazole-Trimethoprim Anaphylaxis   • Trimethoprim Other (See Comments)     UNCONSCIOUS    • Cephalexin Other (See Comments)     lethargic   • Nitrofurantoin Dizziness     Feeling faint   • Penicillins Hives   • Sulfa Antibiotics Confusion   • Morphine Nausea Only     Severe nausea   • Warfarin Itching and Rash     ITCHING          Current Outpatient Medications on File Prior to Visit   Medication Sig Dispense Refill   • ascorbic acid (VITAMIN C) 500 MG tablet Take 500 mg by mouth Daily.     • Calcium Carbonate 1500 (600 Ca) MG tablet Take 600 mg by mouth Daily.     • cholecalciferol (VITAMIN D3) 10 MCG (400 UNIT) tablet Take 400 Units by mouth Daily.     • fish oil-omega-3 fatty acids 1000 MG capsule Take  by mouth Daily.     • Flaxseed, Linseed, 1000 MG capsule Take  by mouth.     • glucosamine-chondroitin 500-400 MG per tablet Take 1 tablet by mouth.     • levothyroxine (SYNTHROID, LEVOTHROID) 50 MCG tablet Take 1 tablet by mouth Daily.  3   • levothyroxine sodium (TIROSINT) 50 MCG capsule Take 50 mcg by mouth.     • multivitamin (multivitamin) tablet tablet Take 1 tablet by mouth Daily.     • zinc sulfate (ZINCATE) 50 MG capsule Take 220 mg by mouth Daily.       No current  "facility-administered medications on file prior to visit.       Objective   /88   Pulse 81   Ht 167.6 cm (66\")   Wt 68.5 kg (151 lb)   BMI 24.37 kg/m²     General:   Appearance: appears stated age and healthy    Orientation: AAOx3    Affect: appropriate    Gait: unimpaired       VASCULAR       Left Foot Vascularity   Normal vascular exam    Dorsalis pedis:  2+  Posterior tibial:  2+  Skin Temperature: warm    Edema Gradin+  CFT:  < 3 seconds  Pedal Hair Growth:  Present  Varicosities: none        NEUROLOGIC      Left Foot Neurologic   Light touch sensation:  Normal  Hot/cold sensation: normal    Achilles reflex:  2+      MUSCULOSKELETAL       Left Foot Musculoskeletal   Ecchymosis:  None  Tenderness: none    Arch:  Normal      MUSCLE STRENGTH      Left Foot Muscle Strength   Normal strength    Foot dorsiflexion:  5  Foot plantar flexion:  5  Foot inversion:  5  Foot eversion:  5      DERMATOLOGIC      Left Foot Dermatologic   Skin: Stable eschar formation to the anterior aspect of the left lower leg consistent with recent injury.  No signs of infection.  No drainage.        TESTS      Left Foot Tests   Anterior drawer: negative    Varus tilt: negative      Assessment/Plan   Diagnoses and all orders for this visit:    1. Chronic venous hypertension with inflammation, left (Primary)      Patient returns after new issue involving the left lower extremity.  She does have a small and stable eschar formation involving the pretibial region.  No complicating features are noted and I do feel that the wound is mostly healed up at this time.  I have advised her to monitor closely and I would like her to wear the over-the-counter compression stockings on a daily basis.  We did review proper use and effects.  She is to monitor closely and call with any new issues.  I would like to see her in 2 weeks for reevaluation    No orders of the defined types were placed in this encounter.         Note is dictated utilizing " voice recognition software. Unfortunately this leads to occasional typographical errors. I apologize in advance if the situation occurs. If questions occur please do not hesitate to call our office.

## 2022-04-08 ENCOUNTER — TRANSCRIBE ORDERS (OUTPATIENT)
Dept: ADMINISTRATIVE | Facility: HOSPITAL | Age: 87
End: 2022-04-08

## 2022-04-08 DIAGNOSIS — I51.89 DIASTOLIC DYSFUNCTION: Primary | ICD-10-CM

## 2022-04-15 ENCOUNTER — HOSPITAL ENCOUNTER (OUTPATIENT)
Facility: HOSPITAL | Age: 87
Setting detail: OBSERVATION
Discharge: HOME | End: 2022-04-16
Attending: EMERGENCY MEDICINE | Admitting: INTERNAL MEDICINE

## 2022-04-15 ENCOUNTER — APPOINTMENT (OUTPATIENT)
Dept: CARDIOLOGY | Facility: HOSPITAL | Age: 87
End: 2022-04-15

## 2022-04-15 ENCOUNTER — APPOINTMENT (OUTPATIENT)
Dept: GENERAL RADIOLOGY | Facility: HOSPITAL | Age: 87
End: 2022-04-15

## 2022-04-15 DIAGNOSIS — R53.1 WEAKNESS: ICD-10-CM

## 2022-04-15 DIAGNOSIS — I82.461 ACUTE DEEP VEIN THROMBOSIS (DVT) OF CALF MUSCLE VEIN OF RIGHT LOWER EXTREMITY: Primary | ICD-10-CM

## 2022-04-15 DIAGNOSIS — Z20.822 COVID-19 VIRUS NOT DETECTED: ICD-10-CM

## 2022-04-15 LAB
ALBUMIN SERPL-MCNC: 3.9 G/DL (ref 3.5–5.2)
ALBUMIN/GLOB SERPL: 1.2 G/DL
ALP SERPL-CCNC: 74 U/L (ref 39–117)
ALT SERPL W P-5'-P-CCNC: 13 U/L (ref 1–33)
ANION GAP SERPL CALCULATED.3IONS-SCNC: 12 MMOL/L (ref 5–15)
APTT PPP: 26.2 SECONDS (ref 61–76.5)
AST SERPL-CCNC: 19 U/L (ref 1–32)
B PARAPERT DNA SPEC QL NAA+PROBE: NOT DETECTED
B PERT DNA SPEC QL NAA+PROBE: NOT DETECTED
BACTERIA UR QL AUTO: ABNORMAL /HPF
BASOPHILS # BLD AUTO: 0 10*3/MM3 (ref 0–0.2)
BASOPHILS NFR BLD AUTO: 0.8 % (ref 0–1.5)
BH CV LOW VAS RIGHT GASTRONEMIUS VESSEL: 1
BH CV LOWER VASCULAR LEFT COMMON FEMORAL AUGMENT: NORMAL
BH CV LOWER VASCULAR LEFT COMMON FEMORAL COMPETENT: NORMAL
BH CV LOWER VASCULAR LEFT COMMON FEMORAL COMPRESS: NORMAL
BH CV LOWER VASCULAR LEFT COMMON FEMORAL PHASIC: NORMAL
BH CV LOWER VASCULAR LEFT COMMON FEMORAL SPONT: NORMAL
BH CV LOWER VASCULAR LEFT DISTAL FEMORAL COMPRESS: NORMAL
BH CV LOWER VASCULAR LEFT GASTRONEMIUS COMPRESS: NORMAL
BH CV LOWER VASCULAR LEFT GREATER SAPH AK COMPRESS: NORMAL
BH CV LOWER VASCULAR LEFT GREATER SAPH BK COMPRESS: NORMAL
BH CV LOWER VASCULAR LEFT LESSER SAPH COMPRESS: NORMAL
BH CV LOWER VASCULAR LEFT MID FEMORAL AUGMENT: NORMAL
BH CV LOWER VASCULAR LEFT MID FEMORAL COMPETENT: NORMAL
BH CV LOWER VASCULAR LEFT MID FEMORAL COMPRESS: NORMAL
BH CV LOWER VASCULAR LEFT MID FEMORAL PHASIC: NORMAL
BH CV LOWER VASCULAR LEFT MID FEMORAL SPONT: NORMAL
BH CV LOWER VASCULAR LEFT PERONEAL COMPRESS: NORMAL
BH CV LOWER VASCULAR LEFT POPLITEAL AUGMENT: NORMAL
BH CV LOWER VASCULAR LEFT POPLITEAL COMPETENT: NORMAL
BH CV LOWER VASCULAR LEFT POPLITEAL COMPRESS: NORMAL
BH CV LOWER VASCULAR LEFT POPLITEAL PHASIC: NORMAL
BH CV LOWER VASCULAR LEFT POPLITEAL SPONT: NORMAL
BH CV LOWER VASCULAR LEFT POSTERIOR TIBIAL COMPRESS: NORMAL
BH CV LOWER VASCULAR LEFT PROXIMAL FEMORAL COMPRESS: NORMAL
BH CV LOWER VASCULAR LEFT SAPHENOFEMORAL JUNCTION COMPRESS: NORMAL
BH CV LOWER VASCULAR RIGHT COMMON FEMORAL AUGMENT: NORMAL
BH CV LOWER VASCULAR RIGHT COMMON FEMORAL COMPETENT: NORMAL
BH CV LOWER VASCULAR RIGHT COMMON FEMORAL COMPRESS: NORMAL
BH CV LOWER VASCULAR RIGHT COMMON FEMORAL PHASIC: NORMAL
BH CV LOWER VASCULAR RIGHT COMMON FEMORAL SPONT: NORMAL
BH CV LOWER VASCULAR RIGHT DISTAL FEMORAL COMPRESS: NORMAL
BH CV LOWER VASCULAR RIGHT GASTRONEMIUS COMPRESS: NORMAL
BH CV LOWER VASCULAR RIGHT GASTRONEMIUS THROMBUS: NORMAL
BH CV LOWER VASCULAR RIGHT GREATER SAPH AK COMPRESS: NORMAL
BH CV LOWER VASCULAR RIGHT GREATER SAPH BK COMPRESS: NORMAL
BH CV LOWER VASCULAR RIGHT LESSER SAPH COMPRESS: NORMAL
BH CV LOWER VASCULAR RIGHT MID FEMORAL AUGMENT: NORMAL
BH CV LOWER VASCULAR RIGHT MID FEMORAL COMPETENT: NORMAL
BH CV LOWER VASCULAR RIGHT MID FEMORAL COMPRESS: NORMAL
BH CV LOWER VASCULAR RIGHT MID FEMORAL PHASIC: NORMAL
BH CV LOWER VASCULAR RIGHT MID FEMORAL SPONT: NORMAL
BH CV LOWER VASCULAR RIGHT PERONEAL COMPRESS: NORMAL
BH CV LOWER VASCULAR RIGHT POPLITEAL AUGMENT: NORMAL
BH CV LOWER VASCULAR RIGHT POPLITEAL COMPETENT: NORMAL
BH CV LOWER VASCULAR RIGHT POPLITEAL COMPRESS: NORMAL
BH CV LOWER VASCULAR RIGHT POPLITEAL PHASIC: NORMAL
BH CV LOWER VASCULAR RIGHT POPLITEAL SPONT: NORMAL
BH CV LOWER VASCULAR RIGHT POSTERIOR TIBIAL COMPRESS: NORMAL
BH CV LOWER VASCULAR RIGHT PROXIMAL FEMORAL COMPRESS: NORMAL
BH CV LOWER VASCULAR RIGHT SAPHENOFEMORAL JUNCTION COMPRESS: NORMAL
BILIRUB SERPL-MCNC: 0.4 MG/DL (ref 0–1.2)
BILIRUB UR QL STRIP: NEGATIVE
BILIRUB UR QL STRIP: NEGATIVE
BUN SERPL-MCNC: 18 MG/DL (ref 8–23)
BUN/CREAT SERPL: 23.1 (ref 7–25)
C PNEUM DNA NPH QL NAA+NON-PROBE: NOT DETECTED
CALCIUM SPEC-SCNC: 9.3 MG/DL (ref 8.6–10.5)
CHLORIDE SERPL-SCNC: 101 MMOL/L (ref 98–107)
CLARITY UR: CLEAR
CLARITY UR: CLEAR
CO2 SERPL-SCNC: 27 MMOL/L (ref 22–29)
COLOR UR: YELLOW
COLOR UR: YELLOW
CREAT SERPL-MCNC: 0.78 MG/DL (ref 0.57–1)
DEPRECATED RDW RBC AUTO: 45.1 FL (ref 37–54)
EGFRCR SERPLBLD CKD-EPI 2021: 75 ML/MIN/1.73
EOSINOPHIL # BLD AUTO: 0.1 10*3/MM3 (ref 0–0.4)
EOSINOPHIL NFR BLD AUTO: 2.3 % (ref 0.3–6.2)
ERYTHROCYTE [DISTWIDTH] IN BLOOD BY AUTOMATED COUNT: 13.8 % (ref 12.3–15.4)
FLUAV SUBTYP SPEC NAA+PROBE: NOT DETECTED
FLUBV RNA ISLT QL NAA+PROBE: NOT DETECTED
GLOBULIN UR ELPH-MCNC: 3.2 GM/DL
GLUCOSE BLDC GLUCOMTR-MCNC: 93 MG/DL (ref 70–105)
GLUCOSE SERPL-MCNC: 101 MG/DL (ref 65–99)
GLUCOSE UR STRIP-MCNC: NEGATIVE MG/DL
GLUCOSE UR STRIP-MCNC: NEGATIVE MG/DL
HADV DNA SPEC NAA+PROBE: NOT DETECTED
HCOV 229E RNA SPEC QL NAA+PROBE: NOT DETECTED
HCOV HKU1 RNA SPEC QL NAA+PROBE: NOT DETECTED
HCOV NL63 RNA SPEC QL NAA+PROBE: NOT DETECTED
HCOV OC43 RNA SPEC QL NAA+PROBE: NOT DETECTED
HCT VFR BLD AUTO: 41.7 % (ref 34–46.6)
HGB BLD-MCNC: 14.4 G/DL (ref 12–15.9)
HGB UR QL STRIP.AUTO: NEGATIVE
HGB UR QL STRIP.AUTO: NEGATIVE
HMPV RNA NPH QL NAA+NON-PROBE: NOT DETECTED
HPIV1 RNA ISLT QL NAA+PROBE: NOT DETECTED
HPIV2 RNA SPEC QL NAA+PROBE: NOT DETECTED
HPIV3 RNA NPH QL NAA+PROBE: NOT DETECTED
HPIV4 P GENE NPH QL NAA+PROBE: NOT DETECTED
HYALINE CASTS UR QL AUTO: ABNORMAL /LPF
INR PPP: 1.43 (ref 0.93–1.1)
KETONES UR QL STRIP: ABNORMAL
KETONES UR QL STRIP: ABNORMAL
LEUKOCYTE ESTERASE UR QL STRIP.AUTO: ABNORMAL
LEUKOCYTE ESTERASE UR QL STRIP.AUTO: NEGATIVE
LYMPHOCYTES # BLD AUTO: 0.8 10*3/MM3 (ref 0.7–3.1)
LYMPHOCYTES NFR BLD AUTO: 22.5 % (ref 19.6–45.3)
M PNEUMO IGG SER IA-ACNC: NOT DETECTED
MAGNESIUM SERPL-MCNC: 1.9 MG/DL (ref 1.6–2.4)
MAXIMAL PREDICTED HEART RATE: 136 BPM
MCH RBC QN AUTO: 31.8 PG (ref 26.6–33)
MCHC RBC AUTO-ENTMCNC: 34.5 G/DL (ref 31.5–35.7)
MCV RBC AUTO: 92.3 FL (ref 79–97)
MONOCYTES # BLD AUTO: 0.4 10*3/MM3 (ref 0.1–0.9)
MONOCYTES NFR BLD AUTO: 10.2 % (ref 5–12)
NEUTROPHILS NFR BLD AUTO: 2.3 10*3/MM3 (ref 1.7–7)
NEUTROPHILS NFR BLD AUTO: 64.2 % (ref 42.7–76)
NITRITE UR QL STRIP: NEGATIVE
NITRITE UR QL STRIP: NEGATIVE
NRBC BLD AUTO-RTO: 0.1 /100 WBC (ref 0–0.2)
NT-PROBNP SERPL-MCNC: 140 PG/ML (ref 0–1800)
PH UR STRIP.AUTO: 6 [PH] (ref 5–8)
PH UR STRIP.AUTO: 6 [PH] (ref 5–8)
PLATELET # BLD AUTO: 167 10*3/MM3 (ref 140–450)
PMV BLD AUTO: 9.4 FL (ref 6–12)
POTASSIUM SERPL-SCNC: 4.5 MMOL/L (ref 3.5–5.2)
PROT SERPL-MCNC: 7.1 G/DL (ref 6–8.5)
PROT UR QL STRIP: NEGATIVE
PROT UR QL STRIP: NEGATIVE
PROTHROMBIN TIME: 14.4 SECONDS (ref 9.6–11.7)
RBC # BLD AUTO: 4.52 10*6/MM3 (ref 3.77–5.28)
RBC # UR STRIP: ABNORMAL /HPF
REF LAB TEST METHOD: ABNORMAL
RHINOVIRUS RNA SPEC NAA+PROBE: NOT DETECTED
RSV RNA NPH QL NAA+NON-PROBE: NOT DETECTED
SARS-COV-2 RNA NPH QL NAA+NON-PROBE: NOT DETECTED
SODIUM SERPL-SCNC: 140 MMOL/L (ref 136–145)
SP GR UR STRIP: 1.01 (ref 1–1.03)
SP GR UR STRIP: 1.01 (ref 1–1.03)
SQUAMOUS #/AREA URNS HPF: ABNORMAL /HPF
STRESS TARGET HR: 116 BPM
TROPONIN T SERPL-MCNC: <0.01 NG/ML (ref 0–0.03)
UROBILINOGEN UR QL STRIP: ABNORMAL
UROBILINOGEN UR QL STRIP: ABNORMAL
WBC # UR STRIP: ABNORMAL /HPF
WBC NRBC COR # BLD: 3.5 10*3/MM3 (ref 3.4–10.8)

## 2022-04-15 PROCEDURE — 84484 ASSAY OF TROPONIN QUANT: CPT | Performed by: PHYSICIAN ASSISTANT

## 2022-04-15 PROCEDURE — 83735 ASSAY OF MAGNESIUM: CPT | Performed by: PHYSICIAN ASSISTANT

## 2022-04-15 PROCEDURE — 93005 ELECTROCARDIOGRAM TRACING: CPT | Performed by: PHYSICIAN ASSISTANT

## 2022-04-15 PROCEDURE — G0378 HOSPITAL OBSERVATION PER HR: HCPCS

## 2022-04-15 PROCEDURE — 93970 EXTREMITY STUDY: CPT

## 2022-04-15 PROCEDURE — 85025 COMPLETE CBC W/AUTO DIFF WBC: CPT | Performed by: PHYSICIAN ASSISTANT

## 2022-04-15 PROCEDURE — 71045 X-RAY EXAM CHEST 1 VIEW: CPT

## 2022-04-15 PROCEDURE — 82962 GLUCOSE BLOOD TEST: CPT

## 2022-04-15 PROCEDURE — 99284 EMERGENCY DEPT VISIT MOD MDM: CPT

## 2022-04-15 PROCEDURE — 83880 ASSAY OF NATRIURETIC PEPTIDE: CPT | Performed by: PHYSICIAN ASSISTANT

## 2022-04-15 PROCEDURE — 99219 PR INITIAL OBSERVATION CARE/DAY 50 MINUTES: CPT | Performed by: NURSE PRACTITIONER

## 2022-04-15 PROCEDURE — 81003 URINALYSIS AUTO W/O SCOPE: CPT | Performed by: PHYSICIAN ASSISTANT

## 2022-04-15 PROCEDURE — 81001 URINALYSIS AUTO W/SCOPE: CPT | Performed by: PHYSICIAN ASSISTANT

## 2022-04-15 PROCEDURE — 80053 COMPREHEN METABOLIC PANEL: CPT | Performed by: PHYSICIAN ASSISTANT

## 2022-04-15 PROCEDURE — P9612 CATHETERIZE FOR URINE SPEC: HCPCS

## 2022-04-15 PROCEDURE — 0202U NFCT DS 22 TRGT SARS-COV-2: CPT | Performed by: PHYSICIAN ASSISTANT

## 2022-04-15 PROCEDURE — 85610 PROTHROMBIN TIME: CPT | Performed by: PHYSICIAN ASSISTANT

## 2022-04-15 PROCEDURE — 85730 THROMBOPLASTIN TIME PARTIAL: CPT | Performed by: PHYSICIAN ASSISTANT

## 2022-04-15 RX ORDER — SODIUM CHLORIDE 0.9 % (FLUSH) 0.9 %
10 SYRINGE (ML) INJECTION AS NEEDED
Status: DISCONTINUED | OUTPATIENT
Start: 2022-04-15 | End: 2022-04-16 | Stop reason: HOSPADM

## 2022-04-15 RX ORDER — LABETALOL HYDROCHLORIDE 5 MG/ML
10 INJECTION, SOLUTION INTRAVENOUS
Status: DISCONTINUED | OUTPATIENT
Start: 2022-04-15 | End: 2022-04-16 | Stop reason: HOSPADM

## 2022-04-15 RX ORDER — DIPHENHYDRAMINE HCL 25 MG
25 CAPSULE ORAL EVERY 6 HOURS PRN
Status: DISCONTINUED | OUTPATIENT
Start: 2022-04-15 | End: 2022-04-16 | Stop reason: HOSPADM

## 2022-04-15 RX ORDER — ALUMINA, MAGNESIA, AND SIMETHICONE 2400; 2400; 240 MG/30ML; MG/30ML; MG/30ML
15 SUSPENSION ORAL EVERY 6 HOURS PRN
Status: DISCONTINUED | OUTPATIENT
Start: 2022-04-15 | End: 2022-04-16 | Stop reason: HOSPADM

## 2022-04-15 RX ORDER — ACETAMINOPHEN 160 MG/5ML
650 SOLUTION ORAL EVERY 4 HOURS PRN
Status: DISCONTINUED | OUTPATIENT
Start: 2022-04-15 | End: 2022-04-16 | Stop reason: HOSPADM

## 2022-04-15 RX ORDER — CHOLECALCIFEROL (VITAMIN D3) 125 MCG
5 CAPSULE ORAL NIGHTLY PRN
Status: DISCONTINUED | OUTPATIENT
Start: 2022-04-15 | End: 2022-04-16 | Stop reason: HOSPADM

## 2022-04-15 RX ORDER — SODIUM CHLORIDE 0.9 % (FLUSH) 0.9 %
10 SYRINGE (ML) INJECTION EVERY 12 HOURS SCHEDULED
Status: DISCONTINUED | OUTPATIENT
Start: 2022-04-15 | End: 2022-04-16 | Stop reason: HOSPADM

## 2022-04-15 RX ORDER — LEVOTHYROXINE SODIUM 0.05 MG/1
50 TABLET ORAL DAILY
Status: DISCONTINUED | OUTPATIENT
Start: 2022-04-16 | End: 2022-04-16 | Stop reason: HOSPADM

## 2022-04-15 RX ORDER — ONDANSETRON 2 MG/ML
4 INJECTION INTRAMUSCULAR; INTRAVENOUS EVERY 6 HOURS PRN
Status: DISCONTINUED | OUTPATIENT
Start: 2022-04-15 | End: 2022-04-16 | Stop reason: HOSPADM

## 2022-04-15 RX ORDER — ACETAMINOPHEN 325 MG/1
650 TABLET ORAL EVERY 4 HOURS PRN
Status: DISCONTINUED | OUTPATIENT
Start: 2022-04-15 | End: 2022-04-16 | Stop reason: HOSPADM

## 2022-04-15 RX ORDER — ONDANSETRON 4 MG/1
4 TABLET, FILM COATED ORAL EVERY 6 HOURS PRN
Status: DISCONTINUED | OUTPATIENT
Start: 2022-04-15 | End: 2022-04-16 | Stop reason: HOSPADM

## 2022-04-15 RX ORDER — ACETAMINOPHEN 650 MG/1
650 SUPPOSITORY RECTAL EVERY 4 HOURS PRN
Status: DISCONTINUED | OUTPATIENT
Start: 2022-04-15 | End: 2022-04-16 | Stop reason: HOSPADM

## 2022-04-15 RX ORDER — LISINOPRIL 5 MG/1
10 TABLET ORAL
Status: DISCONTINUED | OUTPATIENT
Start: 2022-04-15 | End: 2022-04-16

## 2022-04-15 RX ADMIN — LISINOPRIL 10 MG: 5 TABLET ORAL at 17:32

## 2022-04-15 RX ADMIN — APIXABAN 10 MG: 5 TABLET, FILM COATED ORAL at 21:46

## 2022-04-15 RX ADMIN — APIXABAN 10 MG: 5 TABLET, FILM COATED ORAL at 12:11

## 2022-04-15 RX ADMIN — Medication 10 ML: at 21:46

## 2022-04-15 NOTE — PLAN OF CARE
Goal Outcome Evaluation:  Plan of Care Reviewed With: patient           Outcome Evaluation: Patient admitted to floor, care explained, assessment completed, family at bedside

## 2022-04-15 NOTE — CASE MANAGEMENT/SOCIAL WORK
Discharge Planning Assessment  [facility]     Patient Name: Jaleesa Govea  MRN: [id number]  Today's Date: 4/15/2022    Admit Date: 4/15/2022     Discharge Needs Assessment     Row Name 04/15/22 1211       Living Environment    People in Home alone    Current Living Arrangements home    Primary Care Provided by self    Able to Return to Prior Arrangements yes       Resource/Environmental Concerns    Resource/Environmental Concerns none    Transportation Concerns none       Transition Planning    Patient/Family Anticipates Transition to home    Patient/Family Anticipated Services at Transition none    Transportation Anticipated family or friend will provide       Discharge Needs Assessment    Readmission Within the Last 30 Days no previous admission in last 30 days    Equipment Currently Used at Home none    Concerns to be Addressed discharge planning    Anticipated Changes Related to Illness none    Equipment Needed After Discharge none               Discharge Plan     Row Name 04/15/22 1212       Plan    Plan Anticipate Routine Home watch for Anticoag needs.    Patient/Family in Agreement with Plan yes    Plan Comments Met [facility] Patient at bedside in ED. Lives at home alone has local family that can provide transportation at discharge. IADL's PCP and Pharmacy verified. Able to afford medications. [facility] any DME/HH needs. Watch for Anticoag needs at discharge as has dx of DVT.                   Demographic Summary     Row Name 04/15/22 1210       General Information    Arrived From emergency department    Referral Source admission list    Reason for Consult discharge planning    Preferred Language English               Functional Status     Row Name 04/15/22 1211       Functional Status    Usual Activity Tolerance good    Current Activity Tolerance good       Functional Status, IADL    Medications independent    Meal Preparation independent    Housekeeping independent    Laundry independent    Shopping independent        Mental Status    General Appearance WDL WDL       Mental Status Summary    Recent Changes in Mental Status/Cognitive Functioning no changes              Met with patient at bedside wearing mask and goggles, Spent less than 15 minutes in room at greater than 6 feet distance.           Estrellita Montesinos RN

## 2022-04-15 NOTE — ED PROVIDER NOTES
"Subjective   Patient is an 86-year-old female who presents to the ED with complaints of generalized weakness intermittently over the past 3 weeks.  Patient states she woke up this morning and the weakness was much worse.  She states she is also \"shaky all over\".  Patient states she has seen her PCP and had some blood work obtained that was reported to be unremarkable besides slightly elevated cholesterol and \"prediabetes\" patient states her PCP told her that would not cause her symptoms.  Patient states she does have an echocardiogram ordered for the end of the month.  She denies any one-sided numbness weakness, slurred speech, facial droop, headache, visual disturbances, chest pain, shortness of breath, abdominal pain, black or bloody stools, urinary symptoms, cough or congestion.  No recent fever or travel or known sick contacts.  Patient states even ambulating from upstairs to downstairs in her home makes her feel worn out.  Patient also reports bilateral lower extremity calf pain that is been intermittent.  She does report she has noted a \"knot\" along the medial aspect of her left calf which she also talk to her primary care about.  States it has been painful no known injury to the area no redness or warmth and has been decreasing in size over the past several weeks.  Patient does report bilateral lower extremity numbness but does report history of neuropathy.      History provided by:  Patient and relative      Review of Systems   Constitutional: Positive for fatigue. Negative for activity change, appetite change, chills, diaphoresis, fever and unexpected weight change.   HENT: Negative.    Eyes: Negative for photophobia and visual disturbance.   Respiratory: Negative.    Cardiovascular: Negative.    Gastrointestinal: Negative for abdominal distention, abdominal pain, blood in stool, constipation, diarrhea, nausea and vomiting.   Genitourinary: Negative for decreased urine volume, difficulty urinating, dysuria, " flank pain, frequency, hematuria and urgency.   Musculoskeletal: Positive for myalgias. Negative for arthralgias, back pain, neck pain and neck stiffness.   Skin: Negative.    Neurological: Positive for tremors and numbness. Negative for dizziness, seizures, syncope, facial asymmetry, speech difficulty, weakness, light-headedness and headaches.   Hematological: Negative.        Past Medical History:   Diagnosis Date   • Bilateral leg edema 7/26/2019   • History of echocardiogram 07/30/2019    EF 57%, Mild MR/TR/AR/AR, Grade 2 diastolic dysfunction   • Hypothyroidism    • Palpitations 7/26/2019       Allergies   Allergen Reactions   • Sulfamethoxazole-Trimethoprim Anaphylaxis   • Trimethoprim Other (See Comments)     UNCONSCIOUS    • Cephalexin Other (See Comments)     lethargic   • Nitrofurantoin Dizziness     Feeling faint   • Penicillins Hives   • Sulfa Antibiotics Confusion   • Morphine Nausea Only     Severe nausea   • Warfarin Itching and Rash     ITCHING          History reviewed. No pertinent surgical history.    Family History   Problem Relation Age of Onset   • Coronary artery disease Brother        Social History     Socioeconomic History   • Marital status:    Tobacco Use   • Smoking status: Former Smoker   • Smokeless tobacco: Never Used   Vaping Use   • Vaping Use: Never used   Substance and Sexual Activity   • Alcohol use: No   • Drug use: No   • Sexual activity: Defer           Objective   Physical Exam  Vitals and nursing note reviewed.   Constitutional:       General: She is not in acute distress.     Appearance: Normal appearance. She is well-developed. She is not ill-appearing, toxic-appearing or diaphoretic.   HENT:      Head: Normocephalic and atraumatic.      Mouth/Throat:      Mouth: Mucous membranes are moist.      Pharynx: Oropharynx is clear.   Eyes:      General: No scleral icterus.     Extraocular Movements: Extraocular movements intact.      Pupils: Pupils are equal, round, and  reactive to light.   Cardiovascular:      Rate and Rhythm: Normal rate and regular rhythm.      Pulses: Normal pulses.      Heart sounds: No murmur heard.    No friction rub. No gallop.   Pulmonary:      Effort: Pulmonary effort is normal. No respiratory distress.      Breath sounds: Normal breath sounds. No stridor. No wheezing, rhonchi or rales.   Chest:      Chest wall: No tenderness.   Abdominal:      General: Bowel sounds are normal. There is no distension. There are no signs of injury.      Palpations: Abdomen is soft.      Tenderness: There is no abdominal tenderness. There is no right CVA tenderness, left CVA tenderness, guarding or rebound.      Hernia: No hernia is present.   Musculoskeletal:      Cervical back: Normal range of motion and neck supple. No rigidity.        Legs:       Comments: Peripheral pulses are intact compartments are soft of bilateral lower extremities.  There are some tenderness along the calfs bilaterally in the medial aspect.   Skin:     General: Skin is warm.      Capillary Refill: Capillary refill takes less than 2 seconds.      Coloration: Skin is not cyanotic, jaundiced or pale.      Findings: No rash.   Neurological:      General: No focal deficit present.      Mental Status: She is alert and oriented to person, place, and time.      GCS: GCS eye subscore is 4. GCS verbal subscore is 5. GCS motor subscore is 6.      Comments: Moving all extremities freely   Psychiatric:         Mood and Affect: Mood normal.         Behavior: Behavior normal.         Procedures           ED Course  ED Course as of 04/15/22 1130   Fri Apr 15, 2022   1042 Doppler ultrasound positive for DVT in right lower extremity [AA]   1101 Patient would like to repeat urinalysis via in and out cath as she states it was difficult for her to give a clean sample the first time.  We will wait to cover with antibiotics until repeat urine is back. [AA]   1130 Urinalysis With Microscopic If Indicated (No Culture) -  "Urine, Catheter(!) [AA]      ED Course User Index  [AA] Cordero, AlROSELYN Dial    /84   Pulse 74   Temp 97.1 °F (36.2 °C) (Temporal)   Resp 20   Ht 167.6 cm (66\")   Wt 65.8 kg (145 lb)   SpO2 98%   BMI 23.40 kg/m²   Medications   sodium chloride 0.9 % flush 10 mL (has no administration in time range)   enoxaparin (LOVENOX) syringe 70 mg (has no administration in time range)     Labs Reviewed   COMPREHENSIVE METABOLIC PANEL - Abnormal; Notable for the following components:       Result Value    Glucose 101 (*)     All other components within normal limits    Narrative:     GFR Normal >60  Chronic Kidney Disease <60  Kidney Failure <15     URINALYSIS W/ MICROSCOPIC IF INDICATED (NO CULTURE) - Abnormal; Notable for the following components:    Ketones, UA Trace (*)     Leuk Esterase, UA Small (1+) (*)     All other components within normal limits   URINALYSIS, MICROSCOPIC ONLY - Abnormal; Notable for the following components:    RBC, UA 3-5 (*)     WBC, UA 3-5 (*)     Bacteria, UA 3+ (*)     Squamous Epithelial Cells, UA 3-6 (*)     All other components within normal limits   RESPIRATORY PANEL PCR W/ COVID-19 (SARS-COV-2) GREG/ANGELIA/ANIL/PAD/COR/MAD/MAXIMO IN-HOUSE, NP SWAB IN UTM/VTP, 3-4 HR TAT - Normal    Narrative:     In the setting of a positive respiratory panel with a viral infection PLUS a negative procalcitonin without other underlying concern for bacterial infection, consider observing off antibiotics or discontinuation of antibiotics and continue supportive care. If the respiratory panel is positive for atypical bacterial infection (Bordetella pertussis, Chlamydophila pneumoniae, or Mycoplasma pneumoniae), consider antibiotic de-escalation to target atypical bacterial infection.   TROPONIN (IN-HOUSE) - Normal    Narrative:     Troponin T Reference Range:  <= 0.03 ng/mL-   Negative for AMI  >0.03 ng/mL-     Abnormal for myocardial necrosis.  Clinicians would have to utilize clinical acumen, EKG, " Troponin and serial changes to determine if it is an Acute Myocardial Infarction or myocardial injury due to an underlying chronic condition.       Results may be falsely decreased if patient taking Biotin.     BNP (IN-HOUSE) - Normal    Narrative:     Among patients with dyspnea, NT-proBNP is highly sensitive for the detection of acute congestive heart failure. In addition NT-proBNP of <300 pg/ml effectively rules out acute congestive heart failure with 99% negative predictive value.    Results may be falsely decreased if patient taking Biotin.     MAGNESIUM - Normal   CBC WITH AUTO DIFFERENTIAL - Normal   POCT GLUCOSE FINGERSTICK - Normal   PROTIME-INR   APTT   URINALYSIS W/ MICROSCOPIC IF INDICATED (NO CULTURE)   CBC AND DIFFERENTIAL    Narrative:     The following orders were created for panel order CBC & Differential.  Procedure                               Abnormality         Status                     ---------                               -----------         ------                     CBC Auto Differential[[id number]]        Normal              Final result                 Please view results for these tests on the individual orders.     XR Chest 1 View    Result Date: 4/15/2022  No acute chest findings. No significant change from 6/25/2014.  Electronically Signed By-Kita Luis MD On:4/15/2022 9:59 AM This report was finalized on [id number] by  Kita Luis MD.                                                 MDM  Number of Diagnoses or Management Options  Acute deep vein thrombosis (DVT) of calf muscle vein of right lower extremity (HCC)  COVID-19 virus not detected  Weakness  Diagnosis management comments: Chart Review:  -Labs obtained ordered by PCP on 4/6/2022.  Urinalysis unremarkable.  Vitamin D normal TSH free T4 normal folate normal vitamin B12 normal CBC showed WBC 5.3 hemoglobin 14.7 hematocrit 46.2 platelets 218 CMP showed sodium 140 potassium 4.9 glucose 99 BUN 22 creatinine 0.85 lipid  panel total cholesterol 233 lisuride 74 globin A1c 5.9  Comorbidity: As per past medical history  Differentials: UTI, pneumonia, electrode abnormality, DVT, cellulitis, muscle strain, ACS    ;this list is not all inclusive and does not constitute the entirety of considered causes  ECG: Interpreted by myself and Dr. Rubio shows sinus rhythm rate 70 RAD right bundle branch block compared to previous EKG from 6/26/2014 no significant change.  Labs: As above  Imaging: Was interpreted by physician and reviewed by myself:  XR Chest 1 View   Final Result    No acute chest findings. No significant change from 6/25/2014.         Electronically Signed By-Kita Luis MD On:4/15/2022 9:59 AM    This report was finalized on [id number] by  Kita Luis MD.     Disposition/Treatment:  Appropriate PPE was worn during exam and throughout all encounters with the patient.  While in the ED IV was placed and labs are obtained patient placed on proper monitor she was afebrile and appeared nontoxic initially presented to the ED with complaints of dentalized weakness and bilateral lower extremity pain.  Doppler ultrasound was negative for acute DVT of the right lower extremity patient was started on Lovenox.  Patient has no history of DVTs in the past.  No recent surgeries.  But does report she has not been as mobile as she normally is over the past 2 weeks due to her weakness.  Patient was given Lovenox while in the ED.  EKG showed no acute ST or T wave changes CBC and metabolic panel unremarkable.  Troponin magnesium BNP within normal limits.  Respiratory panel unremarkable including COVID-19.  Initial urinalysis likely contaminated with 3-6 squamous epithelial cells noted and 3+ bacteria patient is willing to have repeat urinalysis via in and out cath pending upon admission she currently has no urinary symptoms.  Labs are reviewed from patient's recent PCP visit which were all fairly unremarkable.  Patient had normal thyroid  level for this was not repeated today.  Chest x-ray today showed no acute infiltrates.    Due to patient's increased weakness and acute DVT patient will be admitted to hospitalist group for further management.  Lab results and findings were discussed with the patient and family at bedside he voiced understanding admission and was in agreement with plan.  Spoke to Radha NP who agreed for admission with hospitalist group.  This was also discussed with attending who was in agreement with plan.  I will monitor for repeat urinalysis and discussed with hospitalist if she needs antibiotics.    Repeat urinalysis unremarkable for UTI.       Amount and/or Complexity of Data Reviewed  Clinical lab tests: reviewed  Tests in the radiology section of CPT®: reviewed  Tests in the medicine section of CPT®: reviewed        Final diagnoses:   Acute deep vein thrombosis (DVT) of calf muscle vein of right lower extremity (HCC)   Weakness   COVID-19 virus not detected       ED Disposition  ED Disposition     ED Disposition   Decision to Admit    Condition   --    Comment   Level of Care: Telemetry [5]   Admitting Physician: JHONY CANNON [[id number]]   Attending Physician: JHONY CANNON [[id number]]               No follow-up provider specified.       Medication List      No changes were made to your prescriptions during this visit.          Shari Cordero PA  04/15/22 [id number]

## 2022-04-15 NOTE — H&P
"    [facility] Medicine Services      Patient Name: Jaleesa Govea  : 1935  MRN: [id number]  Primary Care Physician:  Mariluz Barajas MD  Date of admission: 4/15/2022      Subjective      Chief Complaint: weakness, shaky    History of Present Illness: Jaleesa Govea is a 86 y.o. female hypothyroidism, chronic venous hypertension who presented to [facility] ED 4/15/2022 with complaints of an episode of weakness with feeling shaky this morning. She stated she has had a \"pinched nerve\" recently causing right leg pain. She has not been moving around as much due to her right leg pain but the pain seemed to be improving. She has been having spells of weakness to where she feels weak all over like her legs are going to give out and feels shaky all over. She denied any cough, fever, or recent illness. She has had no dysuria. She has had a UTI in the past without symptoms of a UTI.     In the ED patient had lower extremity duplex that showed right leg DVT.  Chest x-ray showed no acute findings.  Lab work unremarkable.  Urinalysis contaminated being recollected with straight cath.  Admitted for further treatment of weakness and acute right leg DVT. She has no prior history of blood clots. She has been on coumadin in the past after her knee replacement and had itching with the coumadin.     Review of Systems   HENT: Negative.    Eyes: Negative.    Cardiovascular: Negative.         Bilateral leg pain   Respiratory: Negative.    Endocrine: Negative.    Hematologic/Lymphatic: Negative.    Skin: Negative.    Musculoskeletal: Negative.    Gastrointestinal: Negative.    Genitourinary: Negative.    Neurological: Positive for weakness.   Psychiatric/Behavioral: Negative.    Allergic/Immunologic: Negative.    All other systems reviewed and are negative.       Personal History     Past Medical History:   Diagnosis Date   • Bilateral leg edema 2019   • History of echocardiogram 2019    EF 57%, " Mild MR/TR/AR/AR, Grade 2 diastolic dysfunction   • Hypothyroidism    • Palpitations 7/26/2019       History reviewed. No pertinent surgical history.    Family History: family history includes Coronary artery disease in her brother. Otherwise pertinent FHx was reviewed and not pertinent to current issue.    Social History:  reports that she has quit smoking. She has never used smokeless tobacco. She reports that she does not drink alcohol and does not use drugs.    Home Medications:  Prior to Admission Medications     Prescriptions Last Dose Informant Patient Reported? Taking?    ascorbic acid (VITAMIN C) 500 MG tablet   Yes No    Take 500 mg by mouth Daily.    Calcium Carbonate 1500 (600 Ca) MG tablet   Yes No    Take 600 mg by mouth Daily.    cholecalciferol (VITAMIN D3) 10 MCG (400 UNIT) tablet   Yes No    Take 400 Units by mouth Daily.    fish oil-omega-3 fatty acids 1000 MG capsule   Yes No    Take  by mouth Daily.    Flaxseed, Linseed, 1000 MG capsule   Yes No    Take  by mouth.    glucosamine-chondroitin 500-400 MG per tablet   Yes No    Take 1 tablet by mouth.    levothyroxine (SYNTHROID, LEVOTHROID) 50 MCG tablet   Yes No    Take 1 tablet by mouth Daily.    levothyroxine sodium (TIROSINT) 50 MCG capsule   Yes No    Take 50 mcg by mouth.    multivitamin (multivitamin) tablet tablet   Yes No    Take 1 tablet by mouth Daily.    zinc sulfate (ZINCATE) 50 MG capsule   Yes No    Take 220 mg by mouth Daily.            Allergies:  Allergies   Allergen Reactions   • Sulfamethoxazole-Trimethoprim Anaphylaxis   • Trimethoprim Other (See Comments)     UNCONSCIOUS    • Cephalexin Other (See Comments)     lethargic   • Nitrofurantoin Dizziness     Feeling faint   • Penicillins Hives   • Sulfa Antibiotics Confusion   • Morphine Nausea Only     Severe nausea   • Warfarin Itching and Rash     ITCHING          Objective      Vitals:   Temp:  [97.1 °F (36.2 °C)] 97.1 °F (36.2 °C)  Heart Rate:  [59-74] 63  Resp:  [20] 20  BP:  (130-181)/(74-84) 150/82    Physical Exam  Vitals and nursing note reviewed.   HENT:      Head: Normocephalic and atraumatic.   Eyes:      Extraocular Movements: Extraocular movements intact.      Pupils: Pupils are equal, round, and reactive to light.   Cardiovascular:      Rate and Rhythm: Normal rate and regular rhythm.      Pulses: Normal pulses.      Heart sounds: Normal heart sounds.   Pulmonary:      Effort: Pulmonary effort is normal.      Breath sounds: Normal breath sounds.   Abdominal:      General: Bowel sounds are normal.      Palpations: Abdomen is soft.      Tenderness: There is no abdominal tenderness.   Musculoskeletal:      Comments: Bilateral leg pain   Skin:     General: Skin is warm and dry.   Neurological:      Mental Status: She is alert and oriented to person, place, and time.   Psychiatric:         Mood and Affect: Mood normal.         Behavior: Behavior normal.        Result Review    Result Review:  I have personally reviewed the results from the time of this admission to 4/15/2022 12:34 EDT and agree with these findings:  [x]  Laboratory  []  Microbiology  [x]  Radiology  []  EKG/Telemetry   []  Cardiology/Vascular   []  Pathology  [x]  Old records      Assessment/Plan        Active Hospital Problems:  Active Hospital Problems    Diagnosis    • **Acute deep vein thrombosis (DVT) of calf muscle vein of right lower extremity (HCC)    • Weakness    • Hypothyroidism      Plan:     Acute right leg DVT  -Venous duplex showed acute right lower extremity deep vein thrombosis noted in the gastrocnemius  -start eliquis  -consult hematology for further recommendations due to advanced age on anticoagulation     Weakness  -WBC normal, repeat UA without infection, CXR negative   -RVP negative   -check TSH  -fall precautions  -PT eval    Hypothyroidism  -check TSH      DVT prophylaxis:  Medical DVT prophylaxis orders are present.    CODE STATUS:    Level Of Support Discussed With: Patient  Code Status  (Patient has no pulse and is not breathing): CPR (Attempt to Resuscitate)  Medical Interventions (Patient has pulse or is breathing): Full Support    Admission Status:  I believe this patient meets observation status.    I discussed the patient's findings and my recommendations with patient and family.    This patient has been examined wearing appropriate Personal Protective Equipment. 04/15/22      Signature: Electronically signed by SHILOH Castro, 04/15/22, 12:36 PM EDT.

## 2022-04-16 ENCOUNTER — APPOINTMENT (OUTPATIENT)
Dept: CARDIOLOGY | Facility: HOSPITAL | Age: 87
End: 2022-04-16

## 2022-04-16 VITALS
HEIGHT: 66 IN | BODY MASS INDEX: 23.3 KG/M2 | WEIGHT: 145 LBS | RESPIRATION RATE: 20 BRPM | OXYGEN SATURATION: 99 % | SYSTOLIC BLOOD PRESSURE: 127 MMHG | HEART RATE: 69 BPM | DIASTOLIC BLOOD PRESSURE: 58 MMHG | TEMPERATURE: 97.5 F

## 2022-04-16 LAB
ANION GAP SERPL CALCULATED.3IONS-SCNC: 11 MMOL/L (ref 5–15)
BASOPHILS # BLD AUTO: 0 10*3/MM3 (ref 0–0.2)
BASOPHILS NFR BLD AUTO: 0.7 % (ref 0–1.5)
BH CV ECHO MEAS - ACS: 1.77 CM
BH CV ECHO MEAS - AO MAX PG: 6.9 MMHG
BH CV ECHO MEAS - AO MEAN PG: 3.5 MMHG
BH CV ECHO MEAS - AO ROOT DIAM: 2.9 CM
BH CV ECHO MEAS - AO V2 MAX: 131 CM/SEC
BH CV ECHO MEAS - AO V2 VTI: 22 CM
BH CV ECHO MEAS - AVA(I,D): 2.16 CM2
BH CV ECHO MEAS - EDV(CUBED): 94.5 ML
BH CV ECHO MEAS - EDV(MOD-SP4): 44.1 ML
BH CV ECHO MEAS - EF(MOD-SP4): 74.3 %
BH CV ECHO MEAS - ESV(CUBED): 10.9 ML
BH CV ECHO MEAS - ESV(MOD-SP4): 11.3 ML
BH CV ECHO MEAS - FS: 51.4 %
BH CV ECHO MEAS - IVS/LVPW: 1.06 CM
BH CV ECHO MEAS - IVSD: 0.82 CM
BH CV ECHO MEAS - LA DIMENSION(2D): 2.3 CM
BH CV ECHO MEAS - LV DIASTOLIC VOL/BSA (35-75): 25.3 CM2
BH CV ECHO MEAS - LV MASS(C)D: 115.8 GRAMS
BH CV ECHO MEAS - LV MAX PG: 7 MMHG
BH CV ECHO MEAS - LV MEAN PG: 3.2 MMHG
BH CV ECHO MEAS - LV SYSTOLIC VOL/BSA (12-30): 6.5 CM2
BH CV ECHO MEAS - LV V1 MAX: 132 CM/SEC
BH CV ECHO MEAS - LV V1 VTI: 21.5 CM
BH CV ECHO MEAS - LVIDD: 4.6 CM
BH CV ECHO MEAS - LVIDS: 2.22 CM
BH CV ECHO MEAS - LVOT AREA: 2.2 CM2
BH CV ECHO MEAS - LVOT DIAM: 1.67 CM
BH CV ECHO MEAS - LVPWD: 0.77 CM
BH CV ECHO MEAS - MV A MAX VEL: 78.3 CM/SEC
BH CV ECHO MEAS - MV DEC SLOPE: 195.2 CM/SEC2
BH CV ECHO MEAS - MV DEC TIME: 0.3 MSEC
BH CV ECHO MEAS - MV E MAX VEL: 57.8 CM/SEC
BH CV ECHO MEAS - MV E/A: 0.74
BH CV ECHO MEAS - MV MAX PG: 3.1 MMHG
BH CV ECHO MEAS - MV MEAN PG: 0.79 MMHG
BH CV ECHO MEAS - MV V2 VTI: 21.5 CM
BH CV ECHO MEAS - MVA(VTI): 2.2 CM2
BH CV ECHO MEAS - PA ACC TIME: 0.1 SEC
BH CV ECHO MEAS - PA PR(ACCEL): 32.3 MMHG
BH CV ECHO MEAS - PA V2 MAX: 89.6 CM/SEC
BH CV ECHO MEAS - PULM A REVS DUR: 0.11 SEC
BH CV ECHO MEAS - PULM A REVS VEL: 52.4 CM/SEC
BH CV ECHO MEAS - PULM DIAS VEL: 49.9 CM/SEC
BH CV ECHO MEAS - PULM SYS VEL: 76 CM/SEC
BH CV ECHO MEAS - RAP SYSTOLE: 3 MMHG
BH CV ECHO MEAS - RV MAX PG: 2.4 MMHG
BH CV ECHO MEAS - RV V1 MAX: 77.5 CM/SEC
BH CV ECHO MEAS - RV V1 VTI: 14.6 CM
BH CV ECHO MEAS - RVDD: 1.96 CM
BH CV ECHO MEAS - RVSP: 25.3 MMHG
BH CV ECHO MEAS - SI(MOD-SP4): 18.8 ML/M2
BH CV ECHO MEAS - SV(LVOT): 47.4 ML
BH CV ECHO MEAS - SV(MOD-SP4): 32.7 ML
BH CV ECHO MEAS - TR MAX PG: 22.3 MMHG
BH CV ECHO MEAS - TR MAX VEL: 236.2 CM/SEC
BUN SERPL-MCNC: 22 MG/DL (ref 8–23)
BUN/CREAT SERPL: 23.9 (ref 7–25)
CALCIUM SPEC-SCNC: 8.7 MG/DL (ref 8.6–10.5)
CHLORIDE SERPL-SCNC: 103 MMOL/L (ref 98–107)
CO2 SERPL-SCNC: 25 MMOL/L (ref 22–29)
CREAT SERPL-MCNC: 0.92 MG/DL (ref 0.57–1)
DEPRECATED RDW RBC AUTO: 43.3 FL (ref 37–54)
EGFRCR SERPLBLD CKD-EPI 2021: 61.5 ML/MIN/1.73
EOSINOPHIL # BLD AUTO: 0.2 10*3/MM3 (ref 0–0.4)
EOSINOPHIL NFR BLD AUTO: 4.3 % (ref 0.3–6.2)
ERYTHROCYTE [DISTWIDTH] IN BLOOD BY AUTOMATED COUNT: 13.5 % (ref 12.3–15.4)
GLUCOSE SERPL-MCNC: 98 MG/DL (ref 65–99)
HCT VFR BLD AUTO: 38.3 % (ref 34–46.6)
HGB BLD-MCNC: 13.1 G/DL (ref 12–15.9)
LV EF 2D ECHO EST: 60 %
LYMPHOCYTES # BLD AUTO: 1.4 10*3/MM3 (ref 0.7–3.1)
LYMPHOCYTES NFR BLD AUTO: 32.6 % (ref 19.6–45.3)
MAXIMAL PREDICTED HEART RATE: 136 BPM
MCH RBC QN AUTO: 31.9 PG (ref 26.6–33)
MCHC RBC AUTO-ENTMCNC: 34.4 G/DL (ref 31.5–35.7)
MCV RBC AUTO: 92.8 FL (ref 79–97)
MONOCYTES # BLD AUTO: 0.6 10*3/MM3 (ref 0.1–0.9)
MONOCYTES NFR BLD AUTO: 12.7 % (ref 5–12)
NEUTROPHILS NFR BLD AUTO: 2.2 10*3/MM3 (ref 1.7–7)
NEUTROPHILS NFR BLD AUTO: 49.7 % (ref 42.7–76)
NRBC BLD AUTO-RTO: 0.2 /100 WBC (ref 0–0.2)
PLATELET # BLD AUTO: 151 10*3/MM3 (ref 140–450)
PMV BLD AUTO: 10.3 FL (ref 6–12)
POTASSIUM SERPL-SCNC: 4.1 MMOL/L (ref 3.5–5.2)
RBC # BLD AUTO: 4.13 10*6/MM3 (ref 3.77–5.28)
SODIUM SERPL-SCNC: 139 MMOL/L (ref 136–145)
STRESS TARGET HR: 116 BPM
WBC NRBC COR # BLD: 4.4 10*3/MM3 (ref 3.4–10.8)

## 2022-04-16 PROCEDURE — 97161 PT EVAL LOW COMPLEX 20 MIN: CPT

## 2022-04-16 PROCEDURE — G0378 HOSPITAL OBSERVATION PER HR: HCPCS

## 2022-04-16 PROCEDURE — 93306 TTE W/DOPPLER COMPLETE: CPT

## 2022-04-16 PROCEDURE — 36415 COLL VENOUS BLD VENIPUNCTURE: CPT | Performed by: NURSE PRACTITIONER

## 2022-04-16 PROCEDURE — 99203 OFFICE O/P NEW LOW 30 MIN: CPT | Performed by: INTERNAL MEDICINE

## 2022-04-16 PROCEDURE — 80048 BASIC METABOLIC PNL TOTAL CA: CPT | Performed by: NURSE PRACTITIONER

## 2022-04-16 PROCEDURE — 99217 PR OBSERVATION CARE DISCHARGE MANAGEMENT: CPT | Performed by: INTERNAL MEDICINE

## 2022-04-16 PROCEDURE — 85025 COMPLETE CBC W/AUTO DIFF WBC: CPT | Performed by: NURSE PRACTITIONER

## 2022-04-16 PROCEDURE — 93306 TTE W/DOPPLER COMPLETE: CPT | Performed by: INTERNAL MEDICINE

## 2022-04-16 RX ADMIN — LEVOTHYROXINE SODIUM 50 MCG: 0.05 TABLET ORAL at 07:13

## 2022-04-16 RX ADMIN — Medication 10 ML: at 09:15

## 2022-04-16 RX ADMIN — APIXABAN 10 MG: 5 TABLET, FILM COATED ORAL at 09:15

## 2022-04-16 NOTE — PLAN OF CARE
Goal Outcome Evaluation:           Progress: improving  Outcome Evaluation: Patient ambulating in room, no complaints of pain. VSS will continue to monitor vitals

## 2022-04-16 NOTE — PLAN OF CARE
Goal Outcome Evaluation:     Outcome Evaluation: Pt is an 87 y/o female who presents to [facility] with RLE acute DVT on 4/15/22. PT cleared to see pt by nursing. PMH includes but is not limited to hypothyroidism and chronic venous hypertension. Pt lives alone with a 3 stepped entry and was independent with community ambulation without an AD. Pt has multiple canes and RW for use at home. At this time pt demonstrates impaired gait w/ R hip wkn and decreased activity tolerance. Upon entry pt was up ad lindsey in bathroom brushing her teeth. Pt demonstrated improved gait with use of RW and recommended pt use RW. Pt verbalizes understanding and agrees. Pt has supportive family who lives nearby and sister will be taking her home. Recommend home with family assist and HHPT to address gait and strength impairments. Pt has d/c orders in, therefore PT will complete the orders. Please notify if there are changes.

## 2022-04-16 NOTE — DISCHARGE SUMMARY
"             [facility] Medicine Services  DISCHARGE SUMMARY    Patient Name: Jaleesa Govea  : 1935  MRN: [id number]    Date of Admission: 4/15/2022  Date of Discharge: 2022  Primary Care Physician: Mariluz Barajas MD      Presenting Problem:   Weakness [R53.1]  Acute deep vein thrombosis (DVT) of calf muscle vein of right lower extremity (HCC) [I82.461]  COVID-19 virus not detected [Z20.822]    Active and Resolved Hospital Problems:  Active Hospital Problems    Diagnosis POA   • **Acute deep vein thrombosis (DVT) of calf muscle vein of right lower extremity (HCC) [I82.461] Yes   • Weakness [R53.1] Yes   • Hypothyroidism [E03.9] Yes      Resolved Hospital Problems   No resolved problems to display.         Hospital Course     Hospital Course:  Jaleesa Govea is a 86 y.o. female with a past medical history of hypothyroidism, chronic venous hypertension who presented to [facility] ED 4/15/2022 with complaints of an episode of weakness with feeling shaky this morning. She stated she has had a \"pinched nerve\" recently causing right leg pain. She has not been moving around as much due to her right leg pain but the pain seemed to be improving. She has been having spells of weakness to where she feels weak all over like her legs are going to give out and feels shaky all over. In the ED patient had lower extremity duplex that showed right leg DVT.  Chest x-ray showed no acute findings.  Lab work unremarkable. Admitted for further treatment of weakness and acute right leg DVT.  She will be started on Lovenox but transition to Eliquis.  Hematology consulted due to advanced age, patient otherwise healthy and active at home.  Hematology recommending full dose Eliquis treatment which was started during this admission.  The next day patient feels much better, ambulating well.  Also seen by PT and recommending home health on discharge however patient declined.  Has Rollator walker at home.  " Her blood pressure was elevated on admission and lisinopril was started but blood pressures significantly improved and patient does not want any antihypertensives, lisinopril discontinued. Patient is clinically improved and stable to discharge home today with follow-up with PCP and hematology as an outpatient.        DISCHARGE Follow Up Recommendations for labs and diagnostics:   Follow-up with PCP and hematology    Reasons For Change In Medications and Indications for New Medications:  Eliquis added.    Day of Discharge     Vital Signs:  Temp:  [97.5 °F (36.4 °C)-98 °F (36.7 °C)] 97.5 °F (36.4 °C)  Heart Rate:  [58-72] 69  Resp:  [14-21] 20  BP: ()/(45-76) 133/61    Physical Exam:  General: Awake, alert, elderly female, lying in bed, NAD  Eyes: PERRL, EOMI, conjunctive are clear  Cardiovascular: Regular rate and rhythm, no murmurs  Respiratory: Clear to auscultation bilaterally, no wheezing or rales, unlabored breathing  Abdomen: Soft, nontender, positive bowel sounds, no guarding  Neurologic: A&O, CN grossly intact, moves all extremities spontaneously  Musculoskeletal: Normal range of motion, no deformities  Skin: Warm, dry, intact        Pertinent  and/or Most Recent Results     LAB RESULTS:      Lab 04/16/22  0357 04/15/22  1100 04/15/22  0936   WBC 4.40  --  3.50   HEMOGLOBIN 13.1  --  14.4   HEMATOCRIT 38.3  --  41.7   PLATELETS 151  --  167   NEUTROS ABS 2.20  --  2.30   LYMPHS ABS 1.40  --  0.80   MONOS ABS 0.60  --  0.40   EOS ABS 0.20  --  0.10   MCV 92.8  --  92.3   PROTIME  --  14.4*  --    APTT  --  26.2*  --          Lab 04/16/22  0357 04/15/22  0936   SODIUM 139 140   POTASSIUM 4.1 4.5   CHLORIDE 103 101   CO2 25.0 27.0   ANION GAP 11.0 12.0   BUN 22 18   CREATININE 0.92 0.78   EGFR 61.5 75.0   GLUCOSE 98 101*   CALCIUM 8.7 9.3   MAGNESIUM  --  1.9         Lab 04/15/22  0936   TOTAL PROTEIN 7.1   ALBUMIN 3.90   GLOBULIN 3.2   ALT (SGPT) 13   AST (SGOT) 19   BILIRUBIN 0.4   ALK PHOS 74          Lab 04/15/22  1100 04/15/22  0936   PROBNP  --  140.0   TROPONIN T  --  <0.010   PROTIME 14.4*  --    INR 1.43*  --                  Brief Urine Lab Results  (Last result in the past 365 days)      Color   Clarity   Blood   Leuk Est   Nitrite   Protein   CREAT   Urine HCG        04/15/22 1110 Yellow   Clear   Negative   Negative   Negative   Negative               Microbiology Results (last 10 days)     Procedure Component Value - Date/Time    Respiratory Panel PCR w/COVID-19(SARS-CoV-2) [facility] In-House, NP Swab in UTM/VTM, 3-4 HR TAT - Swab, Nasopharynx [[id number]]  (Normal) Collected: 04/15/22 0950    Lab Status: Final result Specimen: Swab from Nasopharynx Updated: 04/15/22 1044     ADENOVIRUS, PCR Not Detected     Coronavirus 229E Not Detected     Coronavirus HKU1 Not Detected     Coronavirus NL63 Not Detected     Coronavirus OC43 Not Detected     COVID19 Not Detected     Human Metapneumovirus Not Detected     Human Rhinovirus/Enterovirus Not Detected     Influenza A PCR Not Detected     Influenza B PCR Not Detected     Parainfluenza Virus 1 Not Detected     Parainfluenza Virus 2 Not Detected     Parainfluenza Virus 3 Not Detected     Parainfluenza Virus 4 Not Detected     RSV, PCR Not Detected     Bordetella pertussis pcr Not Detected     Bordetella parapertussis PCR Not Detected     Chlamydophila pneumoniae PCR Not Detected     Mycoplasma pneumo by PCR Not Detected    Narrative:      In the setting of a positive respiratory panel with a viral infection PLUS a negative procalcitonin without other underlying concern for bacterial infection, consider observing off antibiotics or discontinuation of antibiotics and continue supportive care. If the respiratory panel is positive for atypical bacterial infection (Bordetella pertussis, Chlamydophila pneumoniae, or Mycoplasma pneumoniae), consider antibiotic de-escalation to target atypical bacterial infection.          XR Chest 1 View    Result  Date: 4/15/2022  Impression: No acute chest findings. No significant change from 6/25/2014.  Electronically Signed By-Kita Luis MD On:4/15/2022 9:59 AM This report was finalized on [id number] by  Kita Luis MD.      Results for orders placed during the hospital encounter of 04/15/22    Duplex Venous Lower Extremity - Bilateral CAR    Interpretation Summary  · Acute right lower extremity deep vein thrombosis noted in the gastrocnemius.  · All other veins appeared normal bilaterally.      Results for orders placed during the hospital encounter of 04/15/22    Duplex Venous Lower Extremity - Bilateral CAR    Interpretation Summary  · Acute right lower extremity deep vein thrombosis noted in the gastrocnemius.  · All other veins appeared normal bilaterally.      Results for orders placed during the hospital encounter of 07/30/19    Adult Transthoracic Echo Complete W/ Cont if Necessary Per Protocol    Interpretation Summary  · Left ventricular systolic function is normal.  · Mild mitral valve regurgitation is present  · Mild tricuspid valve regurgitation is present.  · Mild pulmonic valve regurgitation is present.  · Mild aortic valve regurgitation is present.  · Left ventricular diastolic dysfunction (grade II) consistent with pseudonormalization.      Labs Pending at Discharge:      Procedures Performed           Consults:   Consults     Date and Time Order Name Status Description    4/15/2022 12:11 PM Hematology & Oncology Inpatient Consult      4/15/2022 10:48 AM Hospitalist (on-call MD unless specified)              Discharge Details        Discharge Medications      New Medications      Instructions Start Date   apixaban 5 MG tablet tablet  Commonly known as: ELIQUIS   Take 10 mg twice daily for 12 more doses then resume 5 mg twice daily thereafter.         Changes to Medications      Instructions Start Date   levothyroxine 50 MCG tablet  Commonly known as: SYNTHROID, LEVOTHROID  What changed: Another  medication with the same name was removed. Continue taking this medication, and follow the directions you see here.   1 tablet, Oral, Daily         Continue These Medications      Instructions Start Date   ascorbic acid 500 MG tablet  Commonly known as: VITAMIN C   500 mg, Oral, Daily      Calcium Carbonate 1500 (600 Ca) MG tablet   600 mg, Oral, Daily      cholecalciferol 10 MCG (400 UNIT) tablet  Commonly known as: VITAMIN D3   400 Units, Oral, Daily      fish oil-omega-3 fatty acids 1000 MG capsule   Oral, Daily      Flaxseed (Linseed) 1000 MG capsule   Oral      glucosamine-chondroitin 500-400 MG per tablet   1 tablet, Oral      multivitamin tablet tablet   1 tablet, Oral, Daily      zinc sulfate 50 MG capsule  Commonly known as: ZINCATE   220 mg, Oral, Daily             Allergies   Allergen Reactions   • Sulfamethoxazole-Trimethoprim Anaphylaxis   • Trimethoprim Other (See Comments)     UNCONSCIOUS    • Cephalexin Other (See Comments)     lethargic   • Nitrofurantoin Dizziness     Feeling faint   • Penicillins Hives   • Sulfa Antibiotics Confusion   • Morphine Nausea Only     Severe nausea   • Warfarin Itching and Rash     ITCHING            Discharge Disposition:  Home or Self Care    Diet:  Hospital:  Diet Order   Procedures   • Diet Regular         Discharge Activity:         CODE STATUS:  Code Status and Medical Interventions:   Ordered at: 04/15/22 1150     Level Of Support Discussed With:    Patient     Code Status (Patient has no pulse and is not breathing):    CPR (Attempt to Resuscitate)     Medical Interventions (Patient has pulse or is breathing):    Full Support         Future Appointments   Date Time Provider Department Center   4/29/2022  1:45 PM [facility] 1/OUTPATIENT  [facility]           Time spent on Discharge including face to face service:  25 minutes    Signature:    Electronically signed by Jozef Grossman DO, 04/16/22, 1:16 PM EDT.

## 2022-04-16 NOTE — CONSULTS
Hematology/Oncology Inpatient Consultation    Patient name: Jaleesa Govea  : 1935  MRN: [id number]  Referring Provider: Dr. Grossman  Reason for Consultation: DVT    Chief complaint: leg pain/swelling.    History of present illness:    Jaleesa Govea is a 86 y.o. female who presented to [facility] on 4/15/2022 with complaints of right leg pain, swelling.  Patient has had bilateral feet neuropathy for the last several weeks to months.  Patient started to have weakness.  She presented to the emergency room.  Ultrasound Doppler showed right leg DVT.  Patient was started on anticoagulation with Eliquis.    Ultrasound Doppler with acute right lower extremity DVT gastrocnemius    22  Hematology/Oncology was consulted patient has no travel recently, no smoking, no hormonal treatment, no recent COVID infection.  No family history of venous thromboembolism.    He/She  has a past medical history of Bilateral leg edema (2019), History of echocardiogram (2019), Hypothyroidism, and Palpitations (2019).    PCP: Mariluz Barajas MD    History:  Past Medical History:   Diagnosis Date   • Bilateral leg edema 2019   • History of echocardiogram 2019    EF 57%, Mild MR/TR/AR/AR, Grade 2 diastolic dysfunction   • Hypothyroidism    • Palpitations 2019   , History reviewed. No pertinent surgical history.,   Family History   Problem Relation Age of Onset   • Coronary artery disease Brother    ,   Social History     Tobacco Use   • Smoking status: Former Smoker   • Smokeless tobacco: Never Used   Vaping Use   • Vaping Use: Never used   Substance Use Topics   • Alcohol use: No   • Drug use: No   ,   Medications Prior to Admission   Medication Sig Dispense Refill Last Dose   • ascorbic acid (VITAMIN C) 500 MG tablet Take 500 mg by mouth Daily.   2022 at Unknown time   • Calcium Carbonate 1500 (600 Ca) MG tablet Take 600 mg by mouth Daily.   2022 at Unknown  time   • cholecalciferol (VITAMIN D3) 10 MCG (400 UNIT) tablet Take 400 Units by mouth Daily.   4/14/2022 at Unknown time   • fish oil-omega-3 fatty acids 1000 MG capsule Take  by mouth Daily.   4/14/2022 at Unknown time   • Flaxseed, Linseed, 1000 MG capsule Take  by mouth.   4/14/2022 at Unknown time   • glucosamine-chondroitin 500-400 MG per tablet Take 1 tablet by mouth.   4/14/2022 at Unknown time   • levothyroxine (SYNTHROID, LEVOTHROID) 50 MCG tablet Take 1 tablet by mouth Daily.  3 4/15/2022 at Unknown time   • levothyroxine sodium (TIROSINT) 50 MCG capsule Take 50 mcg by mouth.   Past Month at Unknown time   • multivitamin (THERAGRAN) tablet tablet Take 1 tablet by mouth Daily.   4/14/2022 at Unknown time   • zinc sulfate (ZINCATE) 50 MG capsule Take 220 mg by mouth Daily.   4/14/2022 at Unknown time   , Scheduled Meds:  apixaban, 10 mg, Oral, BID   Followed by  [START ON 4/22/2022] apixaban, 5 mg, Oral, BID  levothyroxine, 50 mcg, Oral, Daily  lisinopril, 10 mg, Oral, Q24H  sodium chloride, 10 mL, Intravenous, Q12H    , Continuous Infusions:   , PRN Meds:  •  acetaminophen **OR** acetaminophen **OR** acetaminophen  •  aluminum-magnesium hydroxide-simethicone  •  diphenhydrAMINE  •  labetalol  •  melatonin  •  ondansetron **OR** ondansetron  •  [COMPLETED] Insert peripheral IV **AND** sodium chloride  •  sodium chloride   Allergies:  Sulfamethoxazole-trimethoprim, Trimethoprim, Cephalexin, Nitrofurantoin, Penicillins, Sulfa antibiotics, Morphine, and Warfarin    Subjective     ROS:  Review of Systems   Constitutional: Positive for fatigue. Negative for fever.   HENT: Negative for congestion and nosebleeds.    Eyes: Negative for pain.   Respiratory: Negative for cough and shortness of breath.    Cardiovascular: Negative for chest pain.   Gastrointestinal: Negative for abdominal pain, blood in stool, diarrhea, nausea and vomiting.   Endocrine: Negative for cold intolerance and heat intolerance.  "  Genitourinary: Negative for difficulty urinating.   Musculoskeletal: Positive for arthralgias.   Skin: Negative for rash.   Neurological: Negative for dizziness and headaches.   Hematological: Does not bruise/bleed easily.   Psychiatric/Behavioral: Negative for behavioral problems.        Objective   Vital Signs:   /58 (BP Location: Left arm, Patient Position: Lying)   Pulse 61   Temp 97.8 °F (36.6 °C) (Oral)   Resp 15   Ht 167.6 cm (66\")   Wt 65.8 kg (145 lb)   SpO2 97%   BMI 23.40 kg/m²     Physical Exam: (performed by MD)  Physical Exam  Constitutional:       Appearance: Normal appearance.   HENT:      Head: Normocephalic and atraumatic.   Eyes:      Pupils: Pupils are equal, round, and reactive to light.   Cardiovascular:      Rate and Rhythm: Normal rate and regular rhythm.      Pulses: Normal pulses.      Heart sounds: No murmur heard.  Pulmonary:      Effort: Pulmonary effort is normal.      Breath sounds: Normal breath sounds.   Abdominal:      General: There is no distension.      Palpations: Abdomen is soft. There is no mass.      Tenderness: There is no abdominal tenderness.   Musculoskeletal:         General: Normal range of motion.      Cervical back: Normal range of motion.   Skin:     General: Skin is warm.   Neurological:      General: No focal deficit present.      Mental Status: She is alert.   Psychiatric:         Mood and Affect: Mood normal.         Results Review:  Lab Results (last 48 hours)       Procedure Component Value Units Date/Time    Basic Metabolic Panel [[id number]]  (Normal) Collected: 04/16/22 0357    Specimen: Blood Updated: 04/16/22 044     Glucose 98 mg/dL      BUN 22 mg/dL      Creatinine 0.92 mg/dL      Sodium 139 mmol/L      Potassium 4.1 mmol/L      Chloride 103 mmol/L      CO2 25.0 mmol/L      Calcium 8.7 mg/dL      BUN/Creatinine Ratio 23.9     Anion Gap 11.0 mmol/L      eGFR 61.5 mL/min/1.73      Comment: National Kidney Foundation and American Society of " Nephrology (ASN) Task Force recommended calculation based on the Chronic Kidney Disease Epidemiology Collaboration (CKD-EPI) equation refit without adjustment for race.       Narrative:      GFR Normal >60  Chronic Kidney Disease <60  Kidney Failure <15      CBC Auto Differential [[id number]]  (Abnormal) Collected: 04/16/22 0357    Specimen: Blood Updated: 04/16/22 0416     WBC 4.40 10*3/mm3      RBC 4.13 10*6/mm3      Hemoglobin 13.1 g/dL      Hematocrit 38.3 %      MCV 92.8 fL      MCH 31.9 pg      MCHC 34.4 g/dL      RDW 13.5 %      RDW-SD 43.3 fl      MPV 10.3 fL      Platelets 151 10*3/mm3      Neutrophil % 49.7 %      Lymphocyte % 32.6 %      Monocyte % 12.7 %      Eosinophil % 4.3 %      Basophil % 0.7 %      Neutrophils, Absolute 2.20 10*3/mm3      Lymphocytes, Absolute 1.40 10*3/mm3      Monocytes, Absolute 0.60 10*3/mm3      Eosinophils, Absolute 0.20 10*3/mm3      Basophils, Absolute 0.00 10*3/mm3      nRBC 0.2 /100 WBC     Protime-INR [[id number]]  (Abnormal) Collected: 04/15/22 1100    Specimen: Blood Updated: 04/15/22 1139     Protime 14.4 Seconds      INR 1.43    aPTT [[id number]]  (Abnormal) Collected: 04/15/22 1100    Specimen: Blood Updated: 04/15/22 1139     PTT 26.2 seconds     Urinalysis With Microscopic If Indicated (No Culture) - Urine, Catheter [[id number]]  (Abnormal) Collected: 04/15/22 1110    Specimen: Urine, Catheter Updated: 04/15/22 1120     Color, UA Yellow     Appearance, UA Clear     pH, UA 6.0     Specific Gravity, UA 1.014     Glucose, UA Negative     Ketones, UA 15 mg/dL (1+)     Bilirubin, UA Negative     Blood, UA Negative     Protein, UA Negative     Leuk Esterase, UA Negative     Nitrite, UA Negative     Urobilinogen, UA 0.2 E.U./dL    Narrative:      Urine microscopic not indicated.    Respiratory Panel PCR w/COVID-19(SARS-CoV-2) GREG/ANGELIA/ANIL/PAD/COR/MAD/MAXIMO In-House, NP Swab in UTM/VTM, 3-4 HR TAT - Swab, Nasopharynx [[id number]]  (Normal) Collected: 04/15/22 0950    Specimen:  Swab from Nasopharynx Updated: 04/15/22 1044     ADENOVIRUS, PCR Not Detected     Coronavirus 229E Not Detected     Coronavirus HKU1 Not Detected     Coronavirus NL63 Not Detected     Coronavirus OC43 Not Detected     COVID19 Not Detected     Human Metapneumovirus Not Detected     Human Rhinovirus/Enterovirus Not Detected     Influenza A PCR Not Detected     Influenza B PCR Not Detected     Parainfluenza Virus 1 Not Detected     Parainfluenza Virus 2 Not Detected     Parainfluenza Virus 3 Not Detected     Parainfluenza Virus 4 Not Detected     RSV, PCR Not Detected     Bordetella pertussis pcr Not Detected     Bordetella parapertussis PCR Not Detected     Chlamydophila pneumoniae PCR Not Detected     Mycoplasma pneumo by PCR Not Detected    Narrative:      In the setting of a positive respiratory panel with a viral infection PLUS a negative procalcitonin without other underlying concern for bacterial infection, consider observing off antibiotics or discontinuation of antibiotics and continue supportive care. If the respiratory panel is positive for atypical bacterial infection (Bordetella pertussis, Chlamydophila pneumoniae, or Mycoplasma pneumoniae), consider antibiotic de-escalation to target atypical bacterial infection.    Comprehensive Metabolic Panel [[id number]]  (Abnormal) Collected: 04/15/22 0936    Specimen: Blood Updated: 04/15/22 1010     Glucose 101 mg/dL      BUN 18 mg/dL      Creatinine 0.78 mg/dL      Sodium 140 mmol/L      Potassium 4.5 mmol/L      Chloride 101 mmol/L      CO2 27.0 mmol/L      Calcium 9.3 mg/dL      Total Protein 7.1 g/dL      Albumin 3.90 g/dL      ALT (SGPT) 13 U/L      AST (SGOT) 19 U/L      Alkaline Phosphatase 74 U/L      Total Bilirubin 0.4 mg/dL      Globulin 3.2 gm/dL      A/G Ratio 1.2 g/dL      BUN/Creatinine Ratio 23.1     Anion Gap 12.0 mmol/L      eGFR 75.0 mL/min/1.73      Comment: National Kidney Foundation and American Society of Nephrology (ASN) Task Force  recommended calculation based on the Chronic Kidney Disease Epidemiology Collaboration (CKD-EPI) equation refit without adjustment for race.       Narrative:      GFR Normal >60  Chronic Kidney Disease <60  Kidney Failure <15      Troponin [[id number]]  (Normal) Collected: 04/15/22 0936    Specimen: Blood Updated: 04/15/22 1010     Troponin T <0.010 ng/mL     Narrative:      Troponin T Reference Range:  <= 0.03 ng/mL-   Negative for AMI  >0.03 ng/mL-     Abnormal for myocardial necrosis.  Clinicians would have to utilize clinical acumen, EKG, Troponin and serial changes to determine if it is an Acute Myocardial Infarction or myocardial injury due to an underlying chronic condition.       Results may be falsely decreased if patient taking Biotin.      Magnesium [[id number]]  (Normal) Collected: 04/15/22 0936    Specimen: Blood Updated: 04/15/22 1010     Magnesium 1.9 mg/dL     BNP [[id number]]  (Normal) Collected: 04/15/22 0936    Specimen: Blood Updated: 04/15/22 1007     proBNP 140.0 pg/mL     Narrative:      Among patients with dyspnea, NT-proBNP is highly sensitive for the detection of acute congestive heart failure. In addition NT-proBNP of <300 pg/ml effectively rules out acute congestive heart failure with 99% negative predictive value.    Results may be falsely decreased if patient taking Biotin.      Urinalysis, Microscopic Only - Urine, Clean Catch [[id number]]  (Abnormal) Collected: 04/15/22 0949    Specimen: Urine, Clean Catch Updated: 04/15/22 1002     RBC, UA 3-5 /HPF      WBC, UA 3-5 /HPF      Bacteria, UA 3+ /HPF      Squamous Epithelial Cells, UA 3-6 /HPF      Hyaline Casts, UA None Seen /LPF      Methodology Automated Microscopy    Urinalysis With Microscopic If Indicated (No Culture) - Urine, Clean Catch [[id number]]  (Abnormal) Collected: 04/15/22 0949    Specimen: Urine, Clean Catch Updated: 04/15/22 0958     Color, UA Yellow     Appearance, UA Clear     pH, UA 6.0     Specific Reader, UA 1.015      Glucose, UA Negative     Ketones, UA Trace     Bilirubin, UA Negative     Blood, UA Negative     Protein, UA Negative     Leuk Esterase, UA Small (1+)     Nitrite, UA Negative     Urobilinogen, UA 0.2 E.U./dL    CBC & Differential [[id number]]  (Normal) Collected: 04/15/22 0936    Specimen: Blood Updated: 04/15/22 0943    Narrative:      The following orders were created for panel order CBC & Differential.  Procedure                               Abnormality         Status                     ---------                               -----------         ------                     CBC Auto Differential[[id number]]        Normal              Final result                 Please view results for these tests on the individual orders.    CBC Auto Differential [[id number]]  (Normal) Collected: 04/15/22 0936    Specimen: Blood Updated: 04/15/22 0943     WBC 3.50 10*3/mm3      RBC 4.52 10*6/mm3      Hemoglobin 14.4 g/dL      Hematocrit 41.7 %      MCV 92.3 fL      MCH 31.8 pg      MCHC 34.5 g/dL      RDW 13.8 %      RDW-SD 45.1 fl      MPV 9.4 fL      Platelets 167 10*3/mm3      Neutrophil % 64.2 %      Lymphocyte % 22.5 %      Monocyte % 10.2 %      Eosinophil % 2.3 %      Basophil % 0.8 %      Neutrophils, Absolute 2.30 10*3/mm3      Lymphocytes, Absolute 0.80 10*3/mm3      Monocytes, Absolute 0.40 10*3/mm3      Eosinophils, Absolute 0.10 10*3/mm3      Basophils, Absolute 0.00 10*3/mm3      nRBC 0.1 /100 WBC     POC Glucose Once [[id number]]  (Normal) Collected: 04/15/22 0927    Specimen: Blood Updated: 04/15/22 0928     Glucose 93 mg/dL      Comment: Serial Number: [id number]:  [id number]                Imaging Reviewed:   XR Chest 1 View    Result Date: 4/15/2022  No acute chest findings. No significant change from 6/25/2014.  Electronically Signed By-Kita Luis MD On:4/15/2022 9:59 AM This report was finalized on [id number] by  Kita Luis MD.           Assessment/Plan      Patient is 86-year-old female with  unprovoked venous thromboembolism with right leg DVT    Venous thromboembolism  Unprovoked, ideally would need indefinite anticoagulation  Would recommend at least 3 to 6 months of full dose anticoagulation with Eliquis  She has had immobility as a potential cause, if her mobility improves we can consider changing to prophylactic dose or stopping anticoagulation at end of the interval.    Neuropathy  Bilateral neuropathy   Will benefit from EMG, she is scheduled to see neurology outpatient.    I will see her in clinic.    Electronically signed by Luisa Land MD, 04/16/22, 8:49 AM EDT.        Thank you for this consult. We will be happy to follow along with you.

## 2022-04-16 NOTE — THERAPY EVALUATION
Patient Name: Jaleesa Govea  : 1935    MRN: [id number]                              Today's Date: 2022       Admit Date: 4/15/2022    Visit Dx:     ICD-10-CM ICD-9-CM   1. Acute deep vein thrombosis (DVT) of calf muscle vein of right lower extremity (HCC)  I82.461 453.42   2. Weakness  R53.1 780.79   3. COVID-19 virus not detected  Z20.822 V01.79     Patient Active Problem List   Diagnosis   • Hypothyroidism   • Bilateral leg edema   • Palpitations   • Acute deep vein thrombosis (DVT) of calf muscle vein of right lower extremity (HCC)   • Weakness     Past Medical History:   Diagnosis Date   • Bilateral leg edema 2019   • History of echocardiogram 2019    EF 57%, Mild MR/TR/AR/AR, Grade 2 diastolic dysfunction   • Hypothyroidism    • Palpitations 2019     History reviewed. No pertinent surgical history.   General Information     Row Name 22 [facility]          Physical Therapy Time and Intention    Document Type evaluation  -     Mode of Treatment physical therapy  -     Row Name 22 [facility]          General Information    Prior Level of Function independent:;community mobility;transfer;bed mobility;ADL's;driving;yard work;shopping  Pt did not use AD, but has multiple canes and RW if needed  -[facility] Name 22 [facility]          Living Environment    People in Home alone;other (see comments)  Family lives nearby and are supportive.  -     Row Name 22 [facility]          Home Main Entrance    Number of Stairs, Main Entrance three  -[facility] Name 22 [facility]          Stairs Within Home, Primary    Number of Stairs, Within Home, Primary none  -[facility] Name 22 [facility]          Cognition    Orientation Status (Cognition) oriented x 4  -[facility] Name 22 [facility]          Safety Issues, Functional Mobility    Impairments Affecting Function (Mobility) balance;endurance/activity tolerance;strength  -           User Key  (r) = Recorded By, (t) = Taken By, (c) =  Cosigned By    Initials Name Provider Type    Tiffanie Gonzalez, PT Physical Therapist               Mobility     Row Name 04/16/22 1354          Bed Mobility    Bed Mobility supine-sit  -HANNY     Supine-Sit [place] (Bed Mobility) independent  -     Row Name 04/16/22 1354          Sit-Stand Transfer    Sit-Stand [place] (Transfers) modified independence  -     Assistive Device (Sit-Stand Transfers) walker, front-wheeled  -HANNY     Comment, (Sit-Stand Transfer) STS from EOB w/out AD, then with RW.  -     Row Name 04/16/22 1354          Gait/Stairs (Locomotion)    [place] Level (Gait) modified independence  -     Assistive Device (Gait) walker, front-wheeled  -HANNY     Distance in Feet (Gait) 150ft  -HANNY     Deviations/Abnormal Patterns (Gait) gait speed decreased;stride length decreased  -HANNY     Right Sided Gait Deviations Trendelenburg sign  -HANNY     Number of Steps (Stairs) Pt declines stair assessment  -HANNY     Comment, (Gait/Stairs) Pt demonstrates no LOB throughout using RW. Pt did not use AD for ambulation x20ft, but demonstrates some unsteadiness. Unsteadiness improved following use of RW.  -HANNY           User Key  (r) = Recorded By, (t) = Taken By, (c) = Cosigned By    Initials Name Provider Type    Tiffanie Gonzalez, PT Physical Therapist               Obj/Interventions     Row Name 04/16/22 1356          Range of Motion Comprehensive    General Range of Motion bilateral lower extremity ROM WFL  -     Row Name 04/16/22 1356          Strength Comprehensive (MMT)    Comment, General Manual Muscle Testing (MMT) Assessment BLE grossly 4+/5  -     Row Name 04/16/22 1356          Balance    Balance Assessment sitting static balance;sitting dynamic balance;standing static balance;standing dynamic balance  -HANNY     Static Sitting Balance independent  -HANNY     Dynamic Sitting Balance independent  -HANNY     Position, Sitting Balance unsupported;sitting edge of bed  -HANNY     Static Standing Balance  independent  -     Dynamic Standing Balance modified independence  -     Position/Device Used, Standing Balance unsupported;supported;walker, rolling  -     Row Name 04/16/22 6879          Sensory Assessment (Somatosensory)    Sensory Assessment (Somatosensory) other (see comments)  Pt reports minimal tingling sensation in distal RLE  -HANNY           User Key  (r) = Recorded By, (t) = Taken By, (c) = Cosigned By    Initials Name Provider Type    Tiffanie Gonzalez, EDILSON Physical Therapist               Goals/Plan    No documentation.                Clinical Impression     Row Name 04/16/22 4552          Pain    Pretreatment Pain Rating 0/10 - no pain  -HANNY     Posttreatment Pain Rating 0/10 - no pain  -HANNY     Row Name 04/16/22 9937          Plan of Care Review    Plan of Care Reviewed With patient  -     Outcome Evaluation Pt is an 87 y/o female who presents to [facility] with RLE acute DVT on 4/15/22. PT cleared to see pt by nursing. PMH includes but is not limited to hypothyroidism and chronic venous hypertension. Pt lives alone with a 3 stepped entry and was independent with community ambulation without an AD. Pt has multiple canes and RW for use at home. At this time pt demonstrates impaired gait w/ R hip wkn and decreased activity tolerance. Upon entry pt was up ad lindsey in bathroom brushing her teeth. Pt demonstrated improved gait with use of RW and recommended pt use RW. Pt verbalizes understanding and agrees. Pt has supportive family who lives nearby and sister will be taking her home. Recommend home with family assist and HHPT to address gait and strength impairments. Pt has d/c orders in, therefore PT will complete the orders. Please notify if there are changes.  -     Row Name 04/16/22 2995          Therapy Assessment/Plan (PT)    Therapy Frequency (PT) evaluation only  -     Row Name 04/16/22 9627          Vital Signs    O2 Delivery Pre Treatment room air  -     O2 Delivery Intra Treatment room air  -      O2 Delivery Post Treatment room air  -     Row Name 04/16/22 0584          Positioning and Restraints    Pre-Treatment Position in bed  -     Post Treatment Position bed  -HANNY     In Bed sitting EOB;call light within reach  -           User Key  (r) = Recorded By, (t) = Taken By, (c) = Cosigned By    Initials Name Provider Type    Tiffanie Gonzalez, PT Physical Therapist               Outcome Measures     Row Name 04/16/22 1404          How much help from another person do you currently need...    Turning from your back to your side while in flat bed without using bedrails? 4  -HANNY     Moving from lying on back to sitting on the side of a flat bed without bedrails? 4  -HANNY     Moving to and from a bed to a chair (including a wheelchair)? 4  -HANNY     Standing up from a chair using your arms (e.g., wheelchair, bedside chair)? 4  -HANNY     Climbing 3-5 steps with a railing? 3  -HANNY     To walk in hospital room? 4  -     AM-PAC 6 Clicks Score (PT) 23  -     Row Name 04/16/22 1404          Functional Assessment    Outcome Measure Options AM-PAC 6 Clicks Basic Mobility (PT)  -           User Key  (r) = Recorded By, (t) = Taken By, (c) = Cosigned By    Initials Name Provider Type    Tiffanie Gonzalez, EDILSON Physical Therapist                             Physical Therapy Education                 Title: PT OT SLP Therapies (In Progress)     Topic: Physical Therapy (In Progress)     Point: Mobility training (Done)     Learning Progress Summary           Patient Acceptance, E,TB, VU by HANNY at 4/16/2022 1404                   Point: Home exercise program (Not Started)     Learner Progress:  Not documented in this visit.          Point: Body mechanics (Done)     Learning Progress Summary           Patient Acceptance, E,TB, VU by HANNY at 4/16/2022 1404                   Point: Precautions (Done)     Learning Progress Summary           Patient Acceptance, E,TB, VU by HANNY at 4/16/2022 1404                               User Key      Initials Effective Dates Name Provider Type Discipline     08/23/21 -  Tiffanie Crawford PT Physical Therapist PT              PT Recommendation and Plan     Plan of Care Reviewed With: patient  Outcome Evaluation: Pt is an 87 y/o female who presents to [facility] with RLE acute DVT on 4/15/22. PT cleared to see pt by nursing. PMH includes but is not limited to hypothyroidism and chronic venous hypertension. Pt lives alone with a 3 stepped entry and was independent with community ambulation without an AD. Pt has multiple canes and RW for use at home. At this time pt demonstrates impaired gait w/ R hip wkn and decreased activity tolerance. Upon entry pt was up ad lindsey in bathroom brushing her teeth. Pt demonstrated improved gait with use of RW and recommended pt use RW. Pt verbalizes understanding and agrees. Pt has supportive family who lives nearby and sister will be taking her home. Recommend home with family assist and HHPT to address gait and strength impairments. Pt has d/c orders in, therefore PT will complete the orders. Please notify if there are changes.     Time Calculation:    PT Charges     Row Name 04/16/22 1406             Time Calculation    Start Time 0956  -HANNY      Stop Time 1008  -HANNY      Time Calculation (min) 12 min  -HANNY      PT Received On 04/16/22  -            User Key  (r) = Recorded By, (t) = Taken By, (c) = Cosigned By    Initials Name Provider Type    HANNY Tiffanie Crawford PT Physical Therapist              Therapy Charges for Today     Code Description Service Date Service Provider Modifiers Qty    66888991489 HC PT EVAL LOW COMPLEXITY 3 4/16/2022 Tiffanie Crawford PT GP 1          PT G-Codes  Outcome Measure Options: AM-PAC 6 Clicks Basic Mobility (PT)  AM-PAC 6 Clicks Score (PT): 23    Tiffanie Crawford PT  4/16/2022

## 2022-04-16 NOTE — CASE MANAGEMENT/SOCIAL WORK
Continued Stay Note  GIOVANNI Jono     Patient Name: Jaleesa Govea  MRN: [id number]  Today's Date: 4/16/2022    Admit Date: 4/15/2022     Discharge Plan     Row Name 04/16/22 1749       Plan    Plan Comments notified of anticoagulant needs; m2b stated that she did not have any prescription drug coverage; after speaking to the patient, cm called [employer] and obtained the BIN, PCN, Group/RX#, ID/member for the save RX card that she uses for her medication.  M2B able to provide the first month free, future months would be $100/mon.  Pt was agreeable as she would require it.  pt declined home health.  and reported having a rolling walker at home    Final Discharge Disposition Code 01 - home or self-care    Final Note home                Expected Discharge Date and Time     Expected Discharge Date Expected Discharge Time    Apr 16, 2022         Met with patient in room wearing PPE: mask.      Maintained distance greater than six feet and spent less than 15 minutes in the room.          Flora Knox RN

## 2022-04-17 LAB — QT INTERVAL: 432 MS

## 2022-04-18 ENCOUNTER — TELEPHONE (OUTPATIENT)
Dept: ONCOLOGY | Facility: CLINIC | Age: 87
End: 2022-04-18

## 2022-04-29 ENCOUNTER — APPOINTMENT (OUTPATIENT)
Dept: CARDIOLOGY | Facility: HOSPITAL | Age: 87
End: 2022-04-29

## 2022-04-29 ENCOUNTER — APPOINTMENT (OUTPATIENT)
Dept: LAB | Facility: HOSPITAL | Age: 87
End: 2022-04-29

## 2022-04-29 ENCOUNTER — OFFICE VISIT (OUTPATIENT)
Dept: ONCOLOGY | Facility: CLINIC | Age: 87
End: 2022-04-29

## 2022-04-29 VITALS
OXYGEN SATURATION: 98 % | DIASTOLIC BLOOD PRESSURE: 82 MMHG | WEIGHT: 145 LBS | HEART RATE: 92 BPM | SYSTOLIC BLOOD PRESSURE: 146 MMHG | BODY MASS INDEX: 23.3 KG/M2 | RESPIRATION RATE: 18 BRPM | TEMPERATURE: 96.9 F | HEIGHT: 66 IN

## 2022-04-29 DIAGNOSIS — M79.606 LOWER EXTREMITY PAIN, DIFFUSE, UNSPECIFIED LATERALITY: ICD-10-CM

## 2022-04-29 DIAGNOSIS — R09.89 DIMINISHED PULSES IN LOWER EXTREMITY: ICD-10-CM

## 2022-04-29 DIAGNOSIS — I82.461 ACUTE DEEP VEIN THROMBOSIS (DVT) OF CALF MUSCLE VEIN OF RIGHT LOWER EXTREMITY: Primary | ICD-10-CM

## 2022-04-29 PROCEDURE — 99214 OFFICE O/P EST MOD 30 MIN: CPT | Performed by: INTERNAL MEDICINE

## 2022-04-29 NOTE — PROGRESS NOTES
HEMATOLOGY ONCOLOGY FOLLOW UP        Patient name: Jaleesa Govea  : 1935  MRN: [id number]  Primary Care Physician: Mariluz Barajas MD  Referring Physician: Mariluz Barajas*  Reason For Consult:       History of Present Illness:    Jaleesa Govea is a 86 y.o.  female who presented to [facility] on 4/15/2022 with complaints of right leg pain, swelling.  Patient has had bilateral feet neuropathy for the last several weeks to months.  Patient started to have weakness.  She presented to the emergency room.  Ultrasound Doppler showed right leg DVT.  Patient was started on anticoagulation with Eliquis.     Ultrasound Doppler with acute right lower extremity DVT gastrocnemius     22  Hematology/Oncology was consulted patient has no travel recently, no smoking, no hormonal treatment, no recent COVID infection.  No family history of venous thromboembolism.    He/She  has a past medical history of Bilateral leg edema (2019), History of echocardiogram (2019), Hypothyroidism, and Palpitations (2019).     Subjective:    Patient continues to be on anticoagulation.  No bleeding issues.        The following portions of the patient's history were reviewed and updated as appropriate: allergies, current medications, past family history, past medical history, past social history, past surgical history and problem list.      Past Medical History:   Diagnosis Date   • Bilateral leg edema 2019   • History of echocardiogram 2019    EF 57%, Mild MR/TR/AR/AR, Grade 2 diastolic dysfunction   • Hypothyroidism    • Palpitations 2019       No past surgical history on file.      Current Outpatient Medications:   •  apixaban (ELIQUIS) 5 MG tablet tablet, Take 10 mg twice daily for 12 more doses then resume 5 mg twice daily thereafter.  Indications: DVT/PE (active thrombosis), Disp: 90 tablet, Rfl: 0  •  ascorbic acid (VITAMIN C) 500 MG tablet, Take 500  "mg by mouth Daily., Disp: , Rfl:   •  Calcium Carbonate 1500 (600 Ca) MG tablet, Take 600 mg by mouth Daily., Disp: , Rfl:   •  cholecalciferol (VITAMIN D3) 10 MCG (400 UNIT) tablet, Take 400 Units by mouth Daily., Disp: , Rfl:   •  fish oil-omega-3 fatty acids 1000 MG capsule, Take  by mouth Daily., Disp: , Rfl:   •  Flaxseed, Linseed, 1000 MG capsule, Take  by mouth., Disp: , Rfl:   •  glucosamine-chondroitin 500-400 MG per tablet, Take 1 tablet by mouth., Disp: , Rfl:   •  levothyroxine (SYNTHROID, LEVOTHROID) 50 MCG tablet, Take 1 tablet by mouth Daily., Disp: , Rfl: 3  •  multivitamin (THERAGRAN) tablet tablet, Take 1 tablet by mouth Daily., Disp: , Rfl:     Allergies   Allergen Reactions   • Sulfamethoxazole-Trimethoprim Anaphylaxis   • Trimethoprim Other (See Comments)     UNCONSCIOUS    • Cephalexin Other (See Comments)     lethargic   • Nitrofurantoin Dizziness     Feeling faint   • Penicillins Hives   • Sulfa Antibiotics Confusion   • Morphine Nausea Only     Severe nausea   • Warfarin Itching and Rash     ITCHING          Family History   Problem Relation Age of Onset   • Coronary artery disease Brother        Cancer-related family history is not on file.    Social History     Tobacco Use   • Smoking status: Former Smoker   • Smokeless tobacco: Never Used   Vaping Use   • Vaping Use: Never used   Substance Use Topics   • Alcohol use: No   • Drug use: No     Social History     Social History Narrative   • Not on file        I have reviewed the history of present illness, past medical history, family history, social history, lab results, all notes and other records since the patient was last seen on  4/18/2022.    I have reviewed and confirmed the accuracy of the ROS as documented by the MA/LPN/RN Luisa Land MD    Objective:  Vital signs:  Vitals:    04/29/22 0955   BP: 146/82   Pulse: 92   Resp: 18   Temp: 96.9 °F (36.1 °C)   SpO2: 98%   Weight: 65.8 kg (145 lb)   Height: 167.6 cm (66\")   PainSc: 0-No " pain     Body mass index is 23.4 kg/m².  ECOG  (0) Fully active, able to carry on all predisease performance without restriction    Physical Exam:   Physical Exam  Constitutional:       Appearance: Normal appearance.   HENT:      Head: Normocephalic and atraumatic.   Eyes:      Pupils: Pupils are equal, round, and reactive to light.   Cardiovascular:      Rate and Rhythm: Normal rate and regular rhythm.      Pulses: Normal pulses.      Heart sounds: No murmur heard.  Pulmonary:      Effort: Pulmonary effort is normal.      Breath sounds: Normal breath sounds.   Abdominal:      General: There is no distension.      Palpations: Abdomen is soft. There is no mass.      Tenderness: There is no abdominal tenderness.   Musculoskeletal:         General: Normal range of motion.      Cervical back: Normal range of motion and neck supple.   Skin:     General: Skin is warm.   Neurological:      General: No focal deficit present.      Mental Status: She is alert.   Psychiatric:         Mood and Affect: Mood normal.         I have reexamined the patient and the results are consistent with the previously documented exam. Luisa Land MD     Lab Results - Last 18 Months   Lab Units 04/16/22  0357 04/15/22  0936 04/06/22  1413   WBC 10*3/mm3 4.40 3.50 5.31   HEMOGLOBIN g/dL 13.1 14.4 14.7   HEMATOCRIT % 38.3 41.7 46.2*   PLATELETS 10*3/mm3 151 167 218   MCV fL 92.8 92.3 97.3     Lab Results - Last 18 Months   Lab Units 04/16/22  0357 04/15/22  0936 04/06/22  1413 07/08/21  1034   SODIUM mmol/L 139 140 140 139   POTASSIUM mmol/L 4.1 4.5 4.9 5.3*   CHLORIDE mmol/L 103 101 101 102   TOTAL CO2 mmol/L  --   --  26 23   CO2 mmol/L 25.0 27.0  --   --    BUN mg/dL 22 18 22* 28*   CREATININE mg/dL 0.92 0.78 0.85 0.9   CALCIUM mg/dL 8.7 9.3 10.0 9.6   BILIRUBIN mg/dL  --  0.4 0.4 0.5   ALK PHOS U/L  --  74 89 88   ALT (SGPT) U/L  --  13 15 19   AST (SGOT) U/L  --  19 21 39   GLUCOSE mg/dL 98 101*  --   --        Lab Results   Component Value  Date    GLUCOSE 98 04/16/2022    BUN 22 04/16/2022    CREATININE 0.92 04/16/2022    EGFRIFNONA 58 (L) 12/03/2019    BCR 23.9 04/16/2022    K 4.1 04/16/2022    CO2 25.0 04/16/2022    CALCIUM 8.7 04/16/2022    ALBUMIN 3.90 04/15/2022    LABIL2 1.5 04/06/2022    AST 19 04/15/2022    ALT 13 04/15/2022       Lab Results - Last 18 Months   Lab Units 04/15/22  1100   INR  1.43*   APTT seconds 26.2*       No results found for: IRON, TIBC, FERRITIN    No results found for: FOLATE    No results found for: OCCULTBLD    No results found for: RETICCTPCT  Lab Results   Component Value Date    JNPOYDOY43 534 04/06/2022     No results found for: SPEP, UPEP  No results found for: LDH, URICACID  No results found for: HUNG, RF, SEDRATE  No results found for: FIBRINOGEN, HAPTOGLOBIN  Lab Results   Component Value Date    PTT 26.2 (L) 04/15/2022    INR 1.43 (H) 04/15/2022     No results found for:   No results found for: CEA  No components found for: CA-19-9  No results found for: PSA  No results found for: SEDRATE     Assessment/Plan       Assessment:    Patient is 86-year-old female with unprovoked venous thromboembolism with right leg DVT     Venous thromboembolism  Unprovoked, ideally would need indefinite anticoagulation  Would recommend at least 3 to 6 months of full dose anticoagulation with Eliquis  She has had immobility as a potential cause, this has improved now.  I will recheck a D-dimer and follow-up in 3 months.  Potentially decrease to prophylactic dose versus discontinue, check PVP3MBH score, which is a prospectively validated score to identify low risk further chances of DVT in unprovoked cases.     Neuropathy  Likely related to sciatica.  Improved.    Right leg pain, decreased pulses  Patient requesting referral to vascular surgery.  Wants to see   In 2019, JAS was normal              Thank you very much for providing the opportunity to participate in this patient’s care. Please do not hesitate to call if  there are any other questions.    I have reviewed and confirmed the accuracy of the patient's history: Chief complaint, HPI, ROS, Subjective and Past Family Social History as entered by the MA/LPN/RN.     Luisa Land MD 04/29/22

## 2022-05-10 ENCOUNTER — TELEPHONE (OUTPATIENT)
Dept: ONCOLOGY | Facility: HOSPITAL | Age: 87
End: 2022-05-10

## 2022-05-10 NOTE — TELEPHONE ENCOUNTER
Patient called in asking about her referral to Dr. Mcgrath office. She thought we may be setting that up. I let her know that we have sent over the referral but their office will actually call to set up. I gave patient their phone number to call if she does not hear from them soon. Patient verbalized understanding.

## 2022-06-07 ENCOUNTER — TELEPHONE (OUTPATIENT)
Dept: ONCOLOGY | Facility: HOSPITAL | Age: 87
End: 2022-06-07

## 2022-06-07 NOTE — TELEPHONE ENCOUNTER
Patient called in concerned as she was having many bowel movements and had some tinged blood on the toilet paper. She also complained of a lot of joint pain. She is worried it is caused by her eliquis. I let her know she should continue the eliquis as it does not usually cause these things. Confirmed the above with CHANTEL Paidlla. She stated the patient should continue the eliquis unless she has major bleeding and tinge on tp is not significant and likely hemorrhoid related. She would like her to see Dr. Land to discuss joint pain. Patient verbalized understanding and sent to Barbara to set up a follow up with Dr. Land.

## 2022-07-27 ENCOUNTER — LAB (OUTPATIENT)
Dept: LAB | Facility: HOSPITAL | Age: 87
End: 2022-07-27

## 2022-07-27 DIAGNOSIS — I82.461 ACUTE DEEP VEIN THROMBOSIS (DVT) OF CALF MUSCLE VEIN OF RIGHT LOWER EXTREMITY: ICD-10-CM

## 2022-07-27 LAB
BASOPHILS # BLD AUTO: 0.02 10*3/MM3 (ref 0–0.2)
BASOPHILS NFR BLD AUTO: 0.3 % (ref 0–1.5)
D DIMER PPP FEU-MCNC: 1.11 MG/L (FEU) (ref 0–0.59)
DEPRECATED RDW RBC AUTO: 44.4 FL (ref 37–54)
EOSINOPHIL # BLD AUTO: 0.09 10*3/MM3 (ref 0–0.4)
EOSINOPHIL NFR BLD AUTO: 1.5 % (ref 0.3–6.2)
ERYTHROCYTE [DISTWIDTH] IN BLOOD BY AUTOMATED COUNT: 12.9 % (ref 12.3–15.4)
HCT VFR BLD AUTO: 42 % (ref 34–46.6)
HGB BLD-MCNC: 13.7 G/DL (ref 12–15.9)
LYMPHOCYTES # BLD AUTO: 1.37 10*3/MM3 (ref 0.7–3.1)
LYMPHOCYTES NFR BLD AUTO: 22.8 % (ref 19.6–45.3)
MCH RBC QN AUTO: 31.5 PG (ref 26.6–33)
MCHC RBC AUTO-ENTMCNC: 32.6 G/DL (ref 31.5–35.7)
MCV RBC AUTO: 96.6 FL (ref 79–97)
MONOCYTES # BLD AUTO: 0.54 10*3/MM3 (ref 0.1–0.9)
MONOCYTES NFR BLD AUTO: 9 % (ref 5–12)
NEUTROPHILS NFR BLD AUTO: 3.98 10*3/MM3 (ref 1.7–7)
NEUTROPHILS NFR BLD AUTO: 66.4 % (ref 42.7–76)
PLATELET # BLD AUTO: 257 10*3/MM3 (ref 140–450)
PMV BLD AUTO: 10.4 FL (ref 6–12)
RBC # BLD AUTO: 4.35 10*6/MM3 (ref 3.77–5.28)
WBC NRBC COR # BLD: 6 10*3/MM3 (ref 3.4–10.8)

## 2022-07-27 PROCEDURE — 85025 COMPLETE CBC W/AUTO DIFF WBC: CPT

## 2022-07-27 PROCEDURE — 85379 FIBRIN DEGRADATION QUANT: CPT

## 2022-07-27 PROCEDURE — 36415 COLL VENOUS BLD VENIPUNCTURE: CPT

## 2022-08-03 ENCOUNTER — OFFICE VISIT (OUTPATIENT)
Dept: ONCOLOGY | Facility: CLINIC | Age: 87
End: 2022-08-03

## 2022-08-03 ENCOUNTER — APPOINTMENT (OUTPATIENT)
Dept: LAB | Facility: HOSPITAL | Age: 87
End: 2022-08-03

## 2022-08-03 VITALS
DIASTOLIC BLOOD PRESSURE: 88 MMHG | TEMPERATURE: 96.8 F | HEART RATE: 80 BPM | RESPIRATION RATE: 18 BRPM | OXYGEN SATURATION: 96 % | SYSTOLIC BLOOD PRESSURE: 158 MMHG | BODY MASS INDEX: 23.14 KG/M2 | HEIGHT: 66 IN | WEIGHT: 144 LBS

## 2022-08-03 DIAGNOSIS — I82.461 ACUTE DEEP VEIN THROMBOSIS (DVT) OF CALF MUSCLE VEIN OF RIGHT LOWER EXTREMITY: Primary | ICD-10-CM

## 2022-08-03 PROCEDURE — 99213 OFFICE O/P EST LOW 20 MIN: CPT | Performed by: INTERNAL MEDICINE

## 2022-09-01 ENCOUNTER — TELEPHONE (OUTPATIENT)
Dept: ONCOLOGY | Facility: CLINIC | Age: 87
End: 2022-09-01

## 2022-12-12 DIAGNOSIS — I82.461 ACUTE DEEP VEIN THROMBOSIS (DVT) OF CALF MUSCLE VEIN OF RIGHT LOWER EXTREMITY: Primary | ICD-10-CM

## 2022-12-12 NOTE — PROGRESS NOTES
Pt called in expressing she wanted to stop her eliquis due to having pain that she would like to have managed with Aleve and not tylenol. Per Dr. Land pt was added on to the lab schedule tomorrow to test her d dimer. The pt v/u and had no other questions.

## 2022-12-13 ENCOUNTER — LAB (OUTPATIENT)
Dept: LAB | Facility: HOSPITAL | Age: 87
End: 2022-12-13

## 2022-12-13 ENCOUNTER — TELEPHONE (OUTPATIENT)
Dept: ONCOLOGY | Facility: CLINIC | Age: 87
End: 2022-12-13

## 2022-12-13 DIAGNOSIS — I82.461 ACUTE DEEP VEIN THROMBOSIS (DVT) OF CALF MUSCLE VEIN OF RIGHT LOWER EXTREMITY: ICD-10-CM

## 2022-12-13 LAB — D DIMER PPP FEU-MCNC: 0.33 MG/L (FEU) (ref 0–0.85)

## 2022-12-13 PROCEDURE — 36415 COLL VENOUS BLD VENIPUNCTURE: CPT

## 2022-12-13 PROCEDURE — 85379 FIBRIN DEGRADATION QUANT: CPT

## 2022-12-13 NOTE — TELEPHONE ENCOUNTER
Called pt after Dr. Land reviewed her labs from today and made the pt aware she could stop her blood thinner. She v/u and had no other questions. Pts medication list was updated.

## 2023-01-26 ENCOUNTER — TELEPHONE (OUTPATIENT)
Dept: ONCOLOGY | Facility: CLINIC | Age: 88
End: 2023-01-26

## 2023-01-26 NOTE — TELEPHONE ENCOUNTER
Caller: Jaleesa Govea    Relationship: Self    Best call back number: [phone]    What is the best time to reach you: ASAP    Who are you requesting to speak with (clinical staff, provider,  specific staff member):  SCHEDULING    What was the call regarding: PT WANTS TO BE SURE SHE STILL NEEDS THE LAB APPT. 2/1 BEFORE HER FOLLOW UP WITH DR BROWN 2/8, SINCE SHE HAS NOW BEEN OFF HER ELOQUIS  PLEASE LET PT KNOW IF SHE STILL NEEDS THE LAB A WEEK BEFORE.    Do you require a callback: YES, PLEASE LEAVE VM IF NO ANSWER ON HOME PHONE.

## 2023-01-27 NOTE — TELEPHONE ENCOUNTER
Pt was called and made aware she would still need labs a week prior to ensure her d dimer did not go back up. She v/u and had no other questions.

## 2023-02-01 ENCOUNTER — LAB (OUTPATIENT)
Dept: LAB | Facility: HOSPITAL | Age: 88
End: 2023-02-01
Payer: MEDICARE

## 2023-02-01 DIAGNOSIS — I82.461 ACUTE DEEP VEIN THROMBOSIS (DVT) OF CALF MUSCLE VEIN OF RIGHT LOWER EXTREMITY: ICD-10-CM

## 2023-02-01 LAB
BASOPHILS # BLD AUTO: 0.01 10*3/MM3 (ref 0–0.2)
BASOPHILS NFR BLD AUTO: 0.2 % (ref 0–1.5)
D DIMER PPP FEU-MCNC: 0.57 MG/L (FEU) (ref 0–0.85)
DEPRECATED RDW RBC AUTO: 45.1 FL (ref 37–54)
EOSINOPHIL # BLD AUTO: 0.06 10*3/MM3 (ref 0–0.4)
EOSINOPHIL NFR BLD AUTO: 1 % (ref 0.3–6.2)
ERYTHROCYTE [DISTWIDTH] IN BLOOD BY AUTOMATED COUNT: 13.1 % (ref 12.3–15.4)
HCT VFR BLD AUTO: 44.7 % (ref 34–46.6)
HGB BLD-MCNC: 14.6 G/DL (ref 12–15.9)
LYMPHOCYTES # BLD AUTO: 1.23 10*3/MM3 (ref 0.7–3.1)
LYMPHOCYTES NFR BLD AUTO: 20.3 % (ref 19.6–45.3)
MCH RBC QN AUTO: 31.4 PG (ref 26.6–33)
MCHC RBC AUTO-ENTMCNC: 32.7 G/DL (ref 31.5–35.7)
MCV RBC AUTO: 96.1 FL (ref 79–97)
MONOCYTES # BLD AUTO: 0.65 10*3/MM3 (ref 0.1–0.9)
MONOCYTES NFR BLD AUTO: 10.7 % (ref 5–12)
NEUTROPHILS NFR BLD AUTO: 4.1 10*3/MM3 (ref 1.7–7)
NEUTROPHILS NFR BLD AUTO: 67.8 % (ref 42.7–76)
PLATELET # BLD AUTO: 200 10*3/MM3 (ref 140–450)
PMV BLD AUTO: 11.1 FL (ref 6–12)
RBC # BLD AUTO: 4.65 10*6/MM3 (ref 3.77–5.28)
WBC NRBC COR # BLD: 6.05 10*3/MM3 (ref 3.4–10.8)

## 2023-02-01 PROCEDURE — 85025 COMPLETE CBC W/AUTO DIFF WBC: CPT | Performed by: INTERNAL MEDICINE

## 2023-02-01 PROCEDURE — 85379 FIBRIN DEGRADATION QUANT: CPT | Performed by: INTERNAL MEDICINE

## 2023-02-01 PROCEDURE — 36415 COLL VENOUS BLD VENIPUNCTURE: CPT

## 2023-02-03 NOTE — PROGRESS NOTES
HEMATOLOGY ONCOLOGY FOLLOW UP        Patient name: Jaleesa Govea  : 1935  MRN: [id number]  Primary Care Physician: Mariluz Barajas MD  Referring Physician: Mariluz Barajas*  Reason For Consult:     History of Present Illness:    Jaleesa Govea is a 87 y.o.  female who presented to [facility] on 4/15/2022 with complaints of right leg pain, swelling.  Patient has had bilateral feet neuropathy for the last several weeks to months.  Patient started to have weakness.  She presented to the emergency room.  Ultrasound Doppler showed right leg DVT.  Patient was started on anticoagulation with Eliquis.     Ultrasound Doppler with acute right lower extremity DVT gastrocnemius     22  Hematology/Oncology was consulted patient has no travel recently, no smoking, no hormonal treatment, no recent COVID infection.  No family history of venous thromboembolism.    2022 - CBC unremarkable. Ddimer 1.11  2022 - ddimer 0.33    2023 - ddimer 0.57, no new symptoms.    He/She  has a past medical history of Bilateral leg edema (2019), History of echocardiogram (2019), Hypothyroidism, and Palpitations (2019).     Subjective:    No bleeding issues.  No leg pain, swelling.       Past Medical History:   Diagnosis Date   • Bilateral leg edema 2019   • History of echocardiogram 2019    EF 57%, Mild MR/TR/AR/AR, Grade 2 diastolic dysfunction   • Hypothyroidism    • Palpitations 2019       No past surgical history on file.      Current Outpatient Medications:   •  Calcium Carbonate 1500 (600 Ca) MG tablet, Take 600 mg by mouth Daily., Disp: , Rfl:   •  cholecalciferol (VITAMIN D3) 10 MCG (400 UNIT) tablet, Take 400 Units by mouth Daily., Disp: , Rfl:   •  Flaxseed, Linseed, 1000 MG capsule, Take  by mouth., Disp: , Rfl:   •  glucosamine-chondroitin 500-400 MG per tablet, Take 1 tablet by mouth., Disp: , Rfl:   •  levothyroxine  "(SYNTHROID, LEVOTHROID) 50 MCG tablet, Take 1 tablet by mouth Daily., Disp: , Rfl: 3  •  melatonin 1 MG tablet, Take 1 mg by mouth Daily., Disp: , Rfl:   •  multivitamin (THERAGRAN) tablet tablet, Take 1 tablet by mouth Daily., Disp: , Rfl:   •  naproxen sodium (ALEVE) 220 MG tablet, , Disp: , Rfl:     Allergies   Allergen Reactions   • Sulfamethoxazole-Trimethoprim Anaphylaxis   • Trimethoprim Other (See Comments)     UNCONSCIOUS    • Cephalexin Other (See Comments)     lethargic   • Nitrofurantoin Dizziness     Feeling faint   • Penicillins Hives   • Sulfa Antibiotics Confusion   • Morphine Nausea Only     Severe nausea   • Warfarin Itching and Rash     ITCHING          Family History   Problem Relation Age of Onset   • Coronary artery disease Brother        Cancer-related family history is not on file.    Social History     Tobacco Use   • Smoking status: Former   • Smokeless tobacco: Never   Vaping Use   • Vaping Use: Never used   Substance Use Topics   • Alcohol use: No   • Drug use: No     Social History     Social History Narrative   • Not on file        Objective:  Vital signs:  Vitals:    02/08/23 1031   BP: 172/88   Pulse: 84   Resp: 18   Temp: 97.6 °F (36.4 °C)   SpO2: 98%   Weight: 63.7 kg (140 lb 6.4 oz)   Height: 167.6 cm (66\")   PainSc: 0-No pain     Body mass index is 22.66 kg/m².  ECOG  (0) Fully active, able to carry on all predisease performance without restriction    Physical Exam:   Physical Exam  Constitutional:       Appearance: Normal appearance.   HENT:      Head: Normocephalic and atraumatic.      Mouth/Throat:      Mouth: Mucous membranes are dry.   Eyes:      Extraocular Movements: Extraocular movements intact.      Pupils: Pupils are equal, round, and reactive to light.   Cardiovascular:      Rate and Rhythm: Normal rate and regular rhythm.      Pulses: Normal pulses.      Heart sounds: No murmur heard.  Pulmonary:      Effort: Pulmonary effort is normal.      Breath sounds: Normal " breath sounds.   Abdominal:      General: There is no distension.      Palpations: Abdomen is soft. There is no mass.      Tenderness: There is no abdominal tenderness.   Musculoskeletal:         General: Normal range of motion.      Cervical back: Normal range of motion.   Skin:     General: Skin is warm.   Neurological:      General: No focal deficit present.      Mental Status: She is alert.   Psychiatric:         Mood and Affect: Mood normal.       Lab Results - Last 18 Months   Lab Units 02/01/23  1035 07/27/22  1344 07/12/22  0935   WBC 10*3/mm3 6.05 6.00 5.39   HEMOGLOBIN g/dL 14.6 13.7 13.9   HEMATOCRIT % 44.7 42.0 42.7   PLATELETS 10*3/mm3 200 257 250   MCV fL 96.1 96.6 95.1     Lab Results - Last 18 Months   Lab Units 04/16/22  0357 04/15/22  0936 04/06/22  1413   SODIUM mmol/L 139 140 140   POTASSIUM mmol/L 4.1 4.5 4.9   CHLORIDE mmol/L 103 101 101   TOTAL CO2 mmol/L  --   --  26   CO2 mmol/L 25.0 27.0  --    BUN mg/dL 22 18 22*   CREATININE mg/dL 0.92 0.78 0.85   CALCIUM mg/dL 8.7 9.3 10.0   BILIRUBIN mg/dL  --  0.4 0.4   ALK PHOS U/L  --  74 89   ALT (SGPT) U/L  --  13 15   AST (SGOT) U/L  --  19 21   GLUCOSE mg/dL 98 101*  --        Lab Results   Component Value Date    GLUCOSE 98 04/16/2022    BUN 22 04/16/2022    CREATININE 0.92 04/16/2022    EGFRIFNONA 58 (L) 12/03/2019    BCR 23.9 04/16/2022    K 4.1 04/16/2022    CO2 25.0 04/16/2022    CALCIUM 8.7 04/16/2022    ALBUMIN 3.90 04/15/2022    LABIL2 1.5 04/06/2022    AST 19 04/15/2022    ALT 13 04/15/2022       Lab Results - Last 18 Months   Lab Units 04/15/22  1100   INR  1.43*   APTT seconds 26.2*       No results found for: IRON, TIBC, FERRITIN    No results found for: FOLATE    No results found for: OCCULTBLD    No results found for: RETICCTPCT  Lab Results   Component Value Date    [id number] 534 04/06/2022     No results found for: SPEP, UPEP  No results found for: LDH, URICACID  No results found for: HUNG, RF, SEDRATE  No results found for:  FIBRINOGEN, HAPTOGLOBIN  Lab Results   Component Value Date    PTT 26.2 (L) 04/15/2022    INR 1.43 (H) 04/15/2022     No results found for:   No results found for: CEA  No components found for: CA-19-9  No results found for: PSA  No results found for: SEDRATE     Assessment & Plan     Assessment:    Patient is 87-year-old female with unprovoked venous thromboembolism with right leg DVT     Venous thromboembolism  Unprovoked, ideally would need indefinite anticoagulation  Would recommend at least 3 to 6 months of full dose anticoagulation with Eliquis  She has had immobility as a potential cause, this has improved now.  [id number] was low with no more symptoms,   Now ddimer is worsened 0.57 but no new symptoms of VTE, patient aware of symptoms that need evaluation.    Neuropathy  Likely related to sciatica.  Improved.    Right leg pain, decreased pulses  Has been seen by Dr. Ricardo      Thank you very much for providing the opportunity to participate in this patient’s care. Please do not hesitate to call if there are any other questions.     Luisa Land MD 02/08/23

## 2023-02-08 ENCOUNTER — OFFICE VISIT (OUTPATIENT)
Dept: ONCOLOGY | Facility: CLINIC | Age: 88
End: 2023-02-08
Payer: MEDICARE

## 2023-02-08 VITALS
RESPIRATION RATE: 18 BRPM | WEIGHT: 140.4 LBS | BODY MASS INDEX: 22.56 KG/M2 | SYSTOLIC BLOOD PRESSURE: 172 MMHG | TEMPERATURE: 97.6 F | OXYGEN SATURATION: 98 % | HEART RATE: 84 BPM | HEIGHT: 66 IN | DIASTOLIC BLOOD PRESSURE: 88 MMHG

## 2023-02-08 DIAGNOSIS — I82.461 ACUTE DEEP VEIN THROMBOSIS (DVT) OF CALF MUSCLE VEIN OF RIGHT LOWER EXTREMITY: Primary | ICD-10-CM

## 2023-02-08 PROCEDURE — 99214 OFFICE O/P EST MOD 30 MIN: CPT | Performed by: INTERNAL MEDICINE

## 2023-02-08 RX ORDER — UREA 10 %
1 LOTION (ML) TOPICAL DAILY
COMMUNITY
Start: 2023-02-07 | End: 2024-02-07

## 2023-02-08 RX ORDER — NAPROXEN SODIUM 220 MG
TABLET ORAL
COMMUNITY

## 2023-03-07 ENCOUNTER — OFFICE VISIT (OUTPATIENT)
Dept: WOUND CARE | Facility: HOSPITAL | Age: 88
End: 2023-03-07
Payer: MEDICARE

## 2023-03-07 PROCEDURE — 97602 WOUND(S) CARE NON-SELECTIVE: CPT

## 2023-03-07 PROCEDURE — G0463 HOSPITAL OUTPT CLINIC VISIT: HCPCS

## 2023-03-14 ENCOUNTER — LAB (OUTPATIENT)
Dept: LAB | Facility: HOSPITAL | Age: 88
End: 2023-03-14
Payer: MEDICARE

## 2023-03-14 DIAGNOSIS — I82.461 ACUTE DEEP VEIN THROMBOSIS (DVT) OF CALF MUSCLE VEIN OF RIGHT LOWER EXTREMITY: ICD-10-CM

## 2023-03-14 LAB
BASOPHILS # BLD AUTO: 0.02 10*3/MM3 (ref 0–0.2)
BASOPHILS NFR BLD AUTO: 0.4 % (ref 0–1.5)
D DIMER PPP FEU-MCNC: 1.03 MG/L (FEU) (ref 0–0.85)
DEPRECATED RDW RBC AUTO: 43.5 FL (ref 37–54)
EOSINOPHIL # BLD AUTO: 0.07 10*3/MM3 (ref 0–0.4)
EOSINOPHIL NFR BLD AUTO: 1.3 % (ref 0.3–6.2)
ERYTHROCYTE [DISTWIDTH] IN BLOOD BY AUTOMATED COUNT: 12.7 % (ref 12.3–15.4)
HCT VFR BLD AUTO: 40.3 % (ref 34–46.6)
HGB BLD-MCNC: 13 G/DL (ref 12–15.9)
LYMPHOCYTES # BLD AUTO: 1.15 10*3/MM3 (ref 0.7–3.1)
LYMPHOCYTES NFR BLD AUTO: 21.5 % (ref 19.6–45.3)
MCH RBC QN AUTO: 31.3 PG (ref 26.6–33)
MCHC RBC AUTO-ENTMCNC: 32.3 G/DL (ref 31.5–35.7)
MCV RBC AUTO: 96.9 FL (ref 79–97)
MONOCYTES # BLD AUTO: 0.54 10*3/MM3 (ref 0.1–0.9)
MONOCYTES NFR BLD AUTO: 10.1 % (ref 5–12)
NEUTROPHILS NFR BLD AUTO: 3.58 10*3/MM3 (ref 1.7–7)
NEUTROPHILS NFR BLD AUTO: 66.7 % (ref 42.7–76)
PLATELET # BLD AUTO: 308 10*3/MM3 (ref 140–450)
PMV BLD AUTO: 10.3 FL (ref 6–12)
RBC # BLD AUTO: 4.16 10*6/MM3 (ref 3.77–5.28)
WBC NRBC COR # BLD: 5.36 10*3/MM3 (ref 3.4–10.8)

## 2023-03-14 PROCEDURE — 85025 COMPLETE CBC W/AUTO DIFF WBC: CPT

## 2023-03-14 PROCEDURE — 36415 COLL VENOUS BLD VENIPUNCTURE: CPT

## 2023-03-14 PROCEDURE — 85379 FIBRIN DEGRADATION QUANT: CPT | Performed by: INTERNAL MEDICINE

## 2023-03-17 NOTE — PROGRESS NOTES
HEMATOLOGY ONCOLOGY FOLLOW UP        Patient name: Jaleesa Govea  : 1935  MRN: [id number]  Primary Care Physician: Mariluz Barajas MD  Referring Physician: Mariluz Barajas*  Reason For Consult:     History of Present Illness:    Jaleesa Govea is a 87 y.o.  female who presented to [facility] on 4/15/2022 with complaints of right leg pain, swelling.  Patient has had bilateral feet neuropathy for the last several weeks to months.  Patient started to have weakness.  She presented to the emergency room.  Ultrasound Doppler showed right leg DVT.  Patient was started on anticoagulation with Eliquis.     Ultrasound Doppler with acute right lower extremity DVT gastrocnemius     22  Hematology/Oncology was consulted patient has no travel recently, no smoking, no hormonal treatment, no recent COVID infection.  No family history of venous thromboembolism.    2022 - CBC unremarkable. Ddimer 1.11  2022 - ddimer 0.33    2023 - ddimer 0.57, no new symptoms.    3/14/2023 - ddimer 1.01    He/She  has a past medical history of Bilateral leg edema (2019), History of echocardiogram (2019), Hypothyroidism, and Palpitations (2019).     Subjective:    No leg pain/swelling,     Past Medical History:   Diagnosis Date   • Bilateral leg edema 2019   • History of echocardiogram 2019    EF 57%, Mild MR/TR/AR/AR, Grade 2 diastolic dysfunction   • Hypothyroidism    • Palpitations 2019       No past surgical history on file.      Current Outpatient Medications:   •  Calcium Carbonate 1500 (600 Ca) MG tablet, Take 1 tablet by mouth Daily., Disp: , Rfl:   •  cholecalciferol (VITAMIN D3) 10 MCG (400 UNIT) tablet, Take 1 tablet by mouth Daily., Disp: , Rfl:   •  Flaxseed, Linseed, 1000 MG capsule, Take  by mouth., Disp: , Rfl:   •  glucosamine-chondroitin 500-400 MG per tablet, Take 1 tablet by mouth., Disp: , Rfl:   •  levothyroxine  "(SYNTHROID, LEVOTHROID) 50 MCG tablet, Take 1 tablet by mouth Daily., Disp: , Rfl: 3  •  melatonin 1 MG tablet, Take 1 tablet by mouth Daily., Disp: , Rfl:   •  multivitamin (THERAGRAN) tablet tablet, Take 1 tablet by mouth Daily., Disp: , Rfl:   •  naproxen sodium (ALEVE) 220 MG tablet, , Disp: , Rfl:     Allergies   Allergen Reactions   • Sulfamethoxazole-Trimethoprim Anaphylaxis   • Trimethoprim Other (See Comments)     UNCONSCIOUS    • Cephalexin Other (See Comments)     lethargic   • Nitrofurantoin Dizziness     Feeling faint   • Penicillins Hives   • Sulfa Antibiotics Confusion   • Morphine Nausea Only     Severe nausea   • Warfarin Itching and Rash     ITCHING          Family History   Problem Relation Age of Onset   • Coronary artery disease Brother        Cancer-related family history is not on file.    Social History     Tobacco Use   • Smoking status: Former   • Smokeless tobacco: Never   Vaping Use   • Vaping Use: Never used   Substance Use Topics   • Alcohol use: No   • Drug use: No     Social History     Social History Narrative   • Not on file        Objective:  Vital signs:  Vitals:    03/21/23 0958   BP: 157/90   Pulse: 95   Resp: 18   Temp: 98 °F (36.7 °C)   SpO2: 97%   Weight: 61.2 kg (135 lb)   Height: 167.6 cm (66\")   PainSc: 0-No pain     Body mass index is 21.79 kg/m².  ECOG  (0) Fully active, able to carry on all predisease performance without restriction    Physical Exam:   Physical Exam  Constitutional:       Appearance: Normal appearance.   HENT:      Head: Normocephalic and atraumatic.      Mouth/Throat:      Mouth: Mucous membranes are dry.   Eyes:      Extraocular Movements: Extraocular movements intact.      Pupils: Pupils are equal, round, and reactive to light.   Cardiovascular:      Rate and Rhythm: Normal rate and regular rhythm.      Pulses: Normal pulses.      Heart sounds: No murmur heard.  Pulmonary:      Effort: Pulmonary effort is normal.      Breath sounds: Normal breath " sounds.   Abdominal:      General: There is no distension.      Palpations: Abdomen is soft. There is no mass.      Tenderness: There is no abdominal tenderness.   Musculoskeletal:         General: Normal range of motion.      Cervical back: Normal range of motion and neck supple.   Skin:     General: Skin is warm.   Neurological:      General: No focal deficit present.      Mental Status: She is alert.   Psychiatric:         Mood and Affect: Mood normal.       Lab Results - Last 18 Months   Lab Units 03/14/23  1047 02/01/23  1035 07/27/22  1344   WBC 10*3/mm3 5.36 6.05 6.00   HEMOGLOBIN g/dL 13.0 14.6 13.7   HEMATOCRIT % 40.3 44.7 42.0   PLATELETS 10*3/mm3 308 200 257   MCV fL 96.9 96.1 96.6     Lab Results - Last 18 Months   Lab Units 04/16/22  0357 04/15/22  0936 04/06/22  1413   SODIUM mmol/L 139 140 140   POTASSIUM mmol/L 4.1 4.5 4.9   CHLORIDE mmol/L 103 101 101   TOTAL CO2 mmol/L  --   --  26   CO2 mmol/L 25.0 27.0  --    BUN mg/dL 22 18 22*   CREATININE mg/dL 0.92 0.78 0.85   CALCIUM mg/dL 8.7 9.3 10.0   BILIRUBIN mg/dL  --  0.4 0.4   ALK PHOS U/L  --  74 89   ALT (SGPT) U/L  --  13 15   AST (SGOT) U/L  --  19 21   GLUCOSE mg/dL 98 101*  --        Lab Results   Component Value Date    GLUCOSE 98 04/16/2022    BUN 22 04/16/2022    CREATININE 0.92 04/16/2022    EGFRIFNONA 58 (L) 12/03/2019    BCR 23.9 04/16/2022    K 4.1 04/16/2022    CO2 25.0 04/16/2022    CALCIUM 8.7 04/16/2022    ALBUMIN 3.90 04/15/2022    LABIL2 1.5 04/06/2022    AST 19 04/15/2022    ALT 13 04/15/2022       Lab Results - Last 18 Months   Lab Units 04/15/22  1100   INR  1.43*   APTT seconds 26.2*       No results found for: IRON, TIBC, FERRITIN    No results found for: FOLATE    No results found for: OCCULTBLD    No results found for: RETICCTPCT  Lab Results   Component Value Date    [id number] 534 04/06/2022     No results found for: SPEP, UPEP  No results found for: LDH, URICACID  No results found for: HUNG, RF, SEDRATE  No results found  for: FIBRINOGEN, HAPTOGLOBIN  Lab Results   Component Value Date    PTT 26.2 (L) 04/15/2022    INR 1.43 (H) 04/15/2022     No results found for:   No results found for: CEA  No components found for: CA-19-9  No results found for: PSA  No results found for: SEDRATE     Assessment & Plan     Assessment:    Patient is 87-year-old female with unprovoked venous thromboembolism with right leg DVT     Venous thromboembolism  Unprovoked, ideally would need indefinite anticoagulation  Would recommend at least 3 to 6 months of full dose anticoagulation with Eliquis  She has had immobility as a potential cause, this has improved now.  [id number] was low with no more symptoms,   Now ddimer is slightly elevated to 1.01,   Would consider US doppler now. Bilateral has some mild leg pain b/l    Neuropathy  Likely related to sciatica.  Improved.    Right leg pain, decreased pulses  Has been seen by Dr. Ricardo      Thank you very much for providing the opportunity to participate in this patient’s care. Please do not hesitate to call if there are any other questions.     Luisa Land MD 03/21/23

## 2023-03-21 ENCOUNTER — OFFICE VISIT (OUTPATIENT)
Dept: ONCOLOGY | Facility: CLINIC | Age: 88
End: 2023-03-21
Payer: MEDICARE

## 2023-03-21 ENCOUNTER — HOSPITAL ENCOUNTER (OUTPATIENT)
Dept: CARDIOLOGY | Facility: HOSPITAL | Age: 88
Discharge: HOME | End: 2023-03-21
Admitting: INTERNAL MEDICINE
Payer: MEDICARE

## 2023-03-21 VITALS
WEIGHT: 135 LBS | RESPIRATION RATE: 18 BRPM | DIASTOLIC BLOOD PRESSURE: 90 MMHG | OXYGEN SATURATION: 97 % | HEART RATE: 95 BPM | BODY MASS INDEX: 21.69 KG/M2 | TEMPERATURE: 98 F | HEIGHT: 66 IN | SYSTOLIC BLOOD PRESSURE: 157 MMHG

## 2023-03-21 DIAGNOSIS — M79.604 PAIN IN BOTH LOWER EXTREMITIES: ICD-10-CM

## 2023-03-21 DIAGNOSIS — M79.605 PAIN IN BOTH LOWER EXTREMITIES: ICD-10-CM

## 2023-03-21 DIAGNOSIS — R09.89 DIMINISHED PULSES IN LOWER EXTREMITY: ICD-10-CM

## 2023-03-21 DIAGNOSIS — R60.0 BILATERAL LEG EDEMA: ICD-10-CM

## 2023-03-21 DIAGNOSIS — I82.461 ACUTE DEEP VEIN THROMBOSIS (DVT) OF CALF MUSCLE VEIN OF RIGHT LOWER EXTREMITY: ICD-10-CM

## 2023-03-21 DIAGNOSIS — I82.461 ACUTE DEEP VEIN THROMBOSIS (DVT) OF CALF MUSCLE VEIN OF RIGHT LOWER EXTREMITY: Primary | ICD-10-CM

## 2023-03-21 LAB

## 2023-03-21 PROCEDURE — 1126F AMNT PAIN NOTED NONE PRSNT: CPT | Performed by: INTERNAL MEDICINE

## 2023-03-21 PROCEDURE — 99214 OFFICE O/P EST MOD 30 MIN: CPT | Performed by: INTERNAL MEDICINE

## 2023-03-21 PROCEDURE — 93970 EXTREMITY STUDY: CPT

## 2023-03-28 ENCOUNTER — OFFICE VISIT (OUTPATIENT)
Dept: WOUND CARE | Facility: HOSPITAL | Age: 88
End: 2023-03-28
Payer: MEDICARE

## 2023-03-28 PROCEDURE — G0463 HOSPITAL OUTPT CLINIC VISIT: HCPCS

## 2023-04-11 ENCOUNTER — OFFICE VISIT (OUTPATIENT)
Dept: WOUND CARE | Facility: HOSPITAL | Age: 88
End: 2023-04-11
Payer: MEDICARE

## 2023-04-11 PROCEDURE — G0463 HOSPITAL OUTPT CLINIC VISIT: HCPCS

## 2023-09-12 ENCOUNTER — LAB (OUTPATIENT)
Dept: LAB | Facility: HOSPITAL | Age: 88
End: 2023-09-12
Payer: MEDICARE

## 2023-09-12 DIAGNOSIS — I82.461 ACUTE DEEP VEIN THROMBOSIS (DVT) OF CALF MUSCLE VEIN OF RIGHT LOWER EXTREMITY: ICD-10-CM

## 2023-09-12 LAB
BASOPHILS # BLD AUTO: 0.01 10*3/MM3 (ref 0–0.2)
BASOPHILS NFR BLD AUTO: 0.2 % (ref 0–1.5)
D DIMER PPP FEU-MCNC: 0.93 MG/L (FEU) (ref 0–0.86)
DEPRECATED RDW RBC AUTO: 46.6 FL (ref 37–54)
EOSINOPHIL # BLD AUTO: 0.14 10*3/MM3 (ref 0–0.4)
EOSINOPHIL NFR BLD AUTO: 3 % (ref 0.3–6.2)
ERYTHROCYTE [DISTWIDTH] IN BLOOD BY AUTOMATED COUNT: 13.5 % (ref 12.3–15.4)
HCT VFR BLD AUTO: 42.8 % (ref 34–46.6)
HGB BLD-MCNC: 13.8 G/DL (ref 12–15.9)
LYMPHOCYTES # BLD AUTO: 1.16 10*3/MM3 (ref 0.7–3.1)
LYMPHOCYTES NFR BLD AUTO: 25.2 % (ref 19.6–45.3)
MCH RBC QN AUTO: 31.2 PG (ref 26.6–33)
MCHC RBC AUTO-ENTMCNC: 32.2 G/DL (ref 31.5–35.7)
MCV RBC AUTO: 96.6 FL (ref 79–97)
MONOCYTES # BLD AUTO: 0.49 10*3/MM3 (ref 0.1–0.9)
MONOCYTES NFR BLD AUTO: 10.6 % (ref 5–12)
NEUTROPHILS NFR BLD AUTO: 2.81 10*3/MM3 (ref 1.7–7)
NEUTROPHILS NFR BLD AUTO: 61 % (ref 42.7–76)
PLATELET # BLD AUTO: 149 10*3/MM3 (ref 140–450)
PMV BLD AUTO: 11.6 FL (ref 6–12)
RBC # BLD AUTO: 4.43 10*6/MM3 (ref 3.77–5.28)
WBC NRBC COR # BLD: 4.61 10*3/MM3 (ref 3.4–10.8)

## 2023-09-12 PROCEDURE — 85379 FIBRIN DEGRADATION QUANT: CPT | Performed by: INTERNAL MEDICINE

## 2023-09-12 PROCEDURE — 36415 COLL VENOUS BLD VENIPUNCTURE: CPT

## 2023-09-12 PROCEDURE — 85025 COMPLETE CBC W/AUTO DIFF WBC: CPT

## 2023-09-18 NOTE — PROGRESS NOTES
HEMATOLOGY ONCOLOGY FOLLOW UP        Patient name: Jaleesa Govea  : 1935  MRN: [id number]  Primary Care Physician: Mariluz Barajas MD  Referring Physician: No ref. provider found  Reason For Consult:     History of Present Illness:    Jaleesa Govea is a 88 y.o.  female who presented to [facility] on 4/15/2022 with complaints of right leg pain, swelling.  Patient has had bilateral feet neuropathy for the last several weeks to months.  Patient started to have weakness.  She presented to the emergency room.  Ultrasound Doppler showed right leg DVT.  Patient was started on anticoagulation with Eliquis.     Ultrasound Doppler with acute right lower extremity DVT gastrocnemius     22  Hematology/Oncology was consulted patient has no travel recently, no smoking, no hormonal treatment, no recent COVID infection.  No family history of venous thromboembolism.    2022 - CBC unremarkable. Ddimer 1.11  2022 - ddimer 0.33    2023 - ddimer 0.57, no new symptoms.    3/14/2023 - ddimer 1.01 US doppler was normal     2023 - ddimer 0.89    He/She  has a past medical history of Bilateral leg edema (2019), History of echocardiogram (2019), Hypothyroidism, and Palpitations (2019).     Subjective:    No new symptoms of leg pain , swelling, no shortness of breath no chest pain.     Past Medical History:   Diagnosis Date    Bilateral leg edema 2019    History of echocardiogram 2019    EF 57%, Mild MR/TR/AR/AR, Grade 2 diastolic dysfunction    Hypothyroidism     Palpitations 2019       No past surgical history on file.      Current Outpatient Medications:     Calcium Carbonate 1500 (600 Ca) MG tablet, Take 1 tablet by mouth Daily., Disp: , Rfl:     cholecalciferol (VITAMIN D3) 10 MCG (400 UNIT) tablet, Take 1 tablet by mouth Daily., Disp: , Rfl:     D-MANNOSE PO, Take  by mouth., Disp: , Rfl:     Flaxseed, Linseed, 1000 MG capsule,  "Take  by mouth., Disp: , Rfl:     glucosamine-chondroitin 500-400 MG per tablet, Take 1 tablet by mouth., Disp: , Rfl:     levothyroxine (SYNTHROID, LEVOTHROID) 50 MCG tablet, Take 1 tablet by mouth Daily., Disp: , Rfl: 3    melatonin 1 MG tablet, Take 1 tablet by mouth Daily., Disp: , Rfl:     multivitamin (THERAGRAN) tablet tablet, Take 1 tablet by mouth Daily., Disp: , Rfl:     naproxen sodium (ALEVE) 220 MG tablet, , Disp: , Rfl:     Allergies   Allergen Reactions    Sulfamethoxazole-Trimethoprim Anaphylaxis    Trimethoprim Other (See Comments)     UNCONSCIOUS     Cephalexin Other (See Comments)     lethargic    Nitrofurantoin Dizziness     Feeling faint    Penicillins Hives    Sulfa Antibiotics Confusion    Morphine Nausea Only     Severe nausea    Warfarin Itching and Rash     ITCHING          Family History   Problem Relation Age of Onset    Coronary artery disease Brother        Cancer-related family history is not on file.    Social History     Tobacco Use    Smoking status: Former    Smokeless tobacco: Never   Vaping Use    Vaping Use: Never used   Substance Use Topics    Alcohol use: No    Drug use: No     Social History     Social History Narrative    Not on file        Objective:  Vital signs:  Vitals:    09/19/23 1049   BP: 142/87   Pulse: 73   Resp: 18   Temp: 98.1 °F (36.7 °C)   SpO2: 98%   Weight: 63.6 kg (140 lb 3.2 oz)   Height: 167.6 cm (66\")   PainSc: 0-No pain     Body mass index is 22.63 kg/m².  ECOG  (0) Fully active, able to carry on all predisease performance without restriction    Physical Exam:   Physical Exam  Constitutional:       Appearance: Normal appearance.   HENT:      Head: Normocephalic and atraumatic.      Mouth/Throat:      Mouth: Mucous membranes are dry.   Eyes:      Extraocular Movements: Extraocular movements intact.      Pupils: Pupils are equal, round, and reactive to light.   Cardiovascular:      Rate and Rhythm: Normal rate and regular rhythm.      Pulses: Normal " pulses.      Heart sounds: No murmur heard.  Pulmonary:      Effort: Pulmonary effort is normal.      Breath sounds: Normal breath sounds.   Abdominal:      General: There is no distension.      Palpations: Abdomen is soft. There is no mass.      Tenderness: There is no abdominal tenderness.   Musculoskeletal:         General: Normal range of motion.      Cervical back: Normal range of motion and neck supple.   Skin:     General: Skin is warm.   Neurological:      General: No focal deficit present.      Mental Status: She is alert.   Psychiatric:         Mood and Affect: Mood normal.     Lab Results - Last 18 Months   Lab Units 09/12/23  1044 03/14/23  1047 02/07/23  1029   WBC 10*3/mm3 4.61 5.36 4.45*   HEMOGLOBIN g/dL 13.8 13.0 14.5   HEMATOCRIT % 42.8 40.3 45.3   PLATELETS 10*3/mm3 149 308 185   MCV fL 96.6 96.9 95.6     Lab Results - Last 18 Months   Lab Units 04/16/22  0357 04/15/22  0936 04/06/22  1413   SODIUM mmol/L 139 140 140   POTASSIUM mmol/L 4.1 4.5 4.9   CHLORIDE mmol/L 103 101 101   TOTAL CO2 mmol/L  --   --  26   CO2 mmol/L 25.0 27.0  --    BUN mg/dL 22 18 22*   CREATININE mg/dL 0.92 0.78 0.85   CALCIUM mg/dL 8.7 9.3 10.0   BILIRUBIN mg/dL  --  0.4 0.4   ALK PHOS U/L  --  74 89   ALT (SGPT) U/L  --  13 15   AST (SGOT) U/L  --  19 21   GLUCOSE mg/dL 98 101*  --        Lab Results   Component Value Date    GLUCOSE 98 04/16/2022    BUN 22 04/16/2022    CREATININE 0.92 04/16/2022    EGFRIFNONA 58 (L) 12/03/2019    BCR 23.9 04/16/2022    K 4.1 04/16/2022    CO2 25.0 04/16/2022    CALCIUM 8.7 04/16/2022    ALBUMIN 3.90 04/15/2022    LABIL2 1.5 04/06/2022    AST 19 04/15/2022    ALT 13 04/15/2022       Lab Results - Last 18 Months   Lab Units 04/15/22  1100   INR  1.43*   APTT seconds 26.2*       No results found for: IRON, TIBC, FERRITIN    No results found for: FOLATE    No results found for: OCCULTBLD    No results found for: RETICCTPCT  Lab Results   Component Value Date    [id number] 534 04/06/2022      No results found for: SPEP, UPEP  No results found for: LDH, URICACID  No results found for: HUNG, RF, SEDRATE  No results found for: FIBRINOGEN, HAPTOGLOBIN  Lab Results   Component Value Date    PTT 26.2 (L) 04/15/2022    INR 1.43 (H) 04/15/2022     No results found for:   No results found for: CEA  No components found for: CA-19-9  No results found for: PSA  No results found for: SEDRATE     Assessment & Plan     Assessment:    Patient is 87-year-old female with unprovoked venous thromboembolism with right leg DVT     Venous thromboembolism  Unprovoked, ideally would need indefinite anticoagulation  Would recommend at least 3 to 6 months of full dose anticoagulation with Eliquis  She has had immobility as a potential cause, this has improved now.  [id number] was low with no more symptoms eliquis was stopped  Now ddimer is slightly elevated but no s/s of dvt,   We discussed reinitiation of eliquis at low dose. She is concerned about being on anticoagulation  Will recommend low dose aspirin. She will start taking it daily.     Neuropathy  Likely related to sciatica.  Improved.    Right leg pain, decreased pulses  Has been seen by Dr. Ricardo      Thank you very much for providing the opportunity to participate in this patient’s care. Please do not hesitate to call if there are any other questions.     Luisa Land MD 09/19/23

## 2023-09-19 ENCOUNTER — OFFICE VISIT (OUTPATIENT)
Dept: ONCOLOGY | Facility: CLINIC | Age: 88
End: 2023-09-19
Payer: MEDICARE

## 2023-09-19 VITALS
HEART RATE: 73 BPM | DIASTOLIC BLOOD PRESSURE: 87 MMHG | OXYGEN SATURATION: 98 % | TEMPERATURE: 98.1 F | WEIGHT: 140.2 LBS | HEIGHT: 66 IN | SYSTOLIC BLOOD PRESSURE: 142 MMHG | RESPIRATION RATE: 18 BRPM | BODY MASS INDEX: 22.53 KG/M2

## 2023-09-19 DIAGNOSIS — I82.461 ACUTE DEEP VEIN THROMBOSIS (DVT) OF CALF MUSCLE VEIN OF RIGHT LOWER EXTREMITY: Primary | ICD-10-CM

## 2023-09-19 DIAGNOSIS — R60.0 BILATERAL LEG EDEMA: ICD-10-CM

## 2023-09-19 PROCEDURE — 1126F AMNT PAIN NOTED NONE PRSNT: CPT | Performed by: INTERNAL MEDICINE

## 2023-09-19 PROCEDURE — 99214 OFFICE O/P EST MOD 30 MIN: CPT | Performed by: INTERNAL MEDICINE

## 2023-09-19 RX ORDER — ASPIRIN 81 MG/1
81 TABLET ORAL DAILY
Qty: 90 TABLET | Refills: 1 | Status: SHIPPED | OUTPATIENT
Start: 2023-09-19

## 2024-03-18 ENCOUNTER — LAB (OUTPATIENT)
Dept: LAB | Facility: HOSPITAL | Age: 89
End: 2024-03-18
Payer: MEDICARE

## 2024-03-18 DIAGNOSIS — R60.0 BILATERAL LEG EDEMA: ICD-10-CM

## 2024-03-18 DIAGNOSIS — I82.461 ACUTE DEEP VEIN THROMBOSIS (DVT) OF CALF MUSCLE VEIN OF RIGHT LOWER EXTREMITY: ICD-10-CM

## 2024-03-18 LAB
BASOPHILS # BLD AUTO: 0.02 10*3/MM3 (ref 0–0.2)
BASOPHILS NFR BLD AUTO: 0.3 % (ref 0–1.5)
D DIMER PPP FEU-MCNC: 0.91 MG/L (FEU) (ref 0–0.86)
DEPRECATED RDW RBC AUTO: 48.2 FL (ref 37–54)
EOSINOPHIL # BLD AUTO: 0.13 10*3/MM3 (ref 0–0.4)
EOSINOPHIL NFR BLD AUTO: 2.2 % (ref 0.3–6.2)
ERYTHROCYTE [DISTWIDTH] IN BLOOD BY AUTOMATED COUNT: 13.8 % (ref 12.3–15.4)
HCT VFR BLD AUTO: 44.2 % (ref 34–46.6)
HGB BLD-MCNC: 14.2 G/DL (ref 12–15.9)
LYMPHOCYTES # BLD AUTO: 1.1 10*3/MM3 (ref 0.7–3.1)
LYMPHOCYTES NFR BLD AUTO: 19 % (ref 19.6–45.3)
MCH RBC QN AUTO: 31.3 PG (ref 26.6–33)
MCHC RBC AUTO-ENTMCNC: 32.1 G/DL (ref 31.5–35.7)
MCV RBC AUTO: 97.4 FL (ref 79–97)
MONOCYTES # BLD AUTO: 0.67 10*3/MM3 (ref 0.1–0.9)
MONOCYTES NFR BLD AUTO: 11.6 % (ref 5–12)
NEUTROPHILS NFR BLD AUTO: 3.88 10*3/MM3 (ref 1.7–7)
NEUTROPHILS NFR BLD AUTO: 66.9 % (ref 42.7–76)
PLATELET # BLD AUTO: 229 10*3/MM3 (ref 140–450)
PMV BLD AUTO: 11.1 FL (ref 6–12)
RBC # BLD AUTO: 4.54 10*6/MM3 (ref 3.77–5.28)
WBC NRBC COR # BLD AUTO: 5.8 10*3/MM3 (ref 3.4–10.8)

## 2024-03-18 PROCEDURE — 85025 COMPLETE CBC W/AUTO DIFF WBC: CPT

## 2024-03-18 PROCEDURE — 36415 COLL VENOUS BLD VENIPUNCTURE: CPT

## 2024-03-18 PROCEDURE — 85379 FIBRIN DEGRADATION QUANT: CPT | Performed by: INTERNAL MEDICINE

## 2024-03-19 NOTE — PROGRESS NOTES
HEMATOLOGY ONCOLOGY FOLLOW UP        Patient name: Jaleesa Govea  : 1935  MRN: [id number]  Primary Care Physician: Mariluz Barajas MD  Referring Physician: No ref. provider found  Reason For Consult:     History of Present Illness:    Jaleesa Govea is a 88 y.o.  female who presented to [facility] on 4/15/2022 with complaints of right leg pain, swelling.  Patient has had bilateral feet neuropathy for the last several weeks to months.  Patient started to have weakness.  She presented to the emergency room.  Ultrasound Doppler showed right leg DVT.  Patient was started on anticoagulation with Eliquis.     Ultrasound Doppler with acute right lower extremity DVT gastrocnemius     22  Hematology/Oncology was consulted patient has no travel recently, no smoking, no hormonal treatment, no recent COVID infection.  No family history of venous thromboembolism.    2022 - CBC unremarkable. Ddimer 1.11  2022 - ddimer 0.33  2023 - ddimer 0.57, no new symptoms.  3/14/2023 - ddimer 1.01 US doppler was normal   2023 - ddimer 0.89  3/18/24 - ddimer 0.91    He/She  has a past medical history of Bilateral leg edema (2019), History of echocardiogram (2019), Hypothyroidism, and Palpitations (2019).     Subjective:    Has increased hemorrhoidal bleeding, has been on aspirin.    Past Medical History:   Diagnosis Date    Bilateral leg edema 2019    History of echocardiogram 2019    EF 57%, Mild MR/TR/AR/AR, Grade 2 diastolic dysfunction    Hypothyroidism     Palpitations 2019       No past surgical history on file.      Current Outpatient Medications:     aspirin 81 MG EC tablet, Take 1 tablet by mouth Daily., Disp: 90 tablet, Rfl: 1    Calcium Carbonate 1500 (600 Ca) MG tablet, Take 1 tablet by mouth Daily., Disp: , Rfl:     cholecalciferol (VITAMIN D3) 10 MCG (400 UNIT) tablet, Take 1 tablet by mouth Daily., Disp: , Rfl:      "D-MANNOSE PO, Take  by mouth., Disp: , Rfl:     Flaxseed, Linseed, 1000 MG capsule, Take  by mouth., Disp: , Rfl:     glucosamine-chondroitin 500-400 MG per tablet, Take 1 tablet by mouth., Disp: , Rfl:     levothyroxine (SYNTHROID, LEVOTHROID) 50 MCG tablet, Take 1 tablet by mouth Daily., Disp: , Rfl: 3    multivitamin (THERAGRAN) tablet tablet, Take 1 tablet by mouth Daily., Disp: , Rfl:     naproxen sodium (ALEVE) 220 MG tablet, , Disp: , Rfl:     Allergies   Allergen Reactions    Sulfamethoxazole-Trimethoprim Anaphylaxis    Trimethoprim Other (See Comments)     UNCONSCIOUS     Cephalexin Other (See Comments)     lethargic    Nitrofurantoin Dizziness     Feeling faint    Penicillins Hives    Sulfa Antibiotics Confusion    Morphine Nausea Only     Severe nausea    Warfarin Itching and Rash     ITCHING          Family History   Problem Relation Age of Onset    Coronary artery disease Brother        Cancer-related family history is not on file.    Social History     Tobacco Use    Smoking status: Former    Smokeless tobacco: Never   Vaping Use    Vaping status: Never Used   Substance Use Topics    Alcohol use: No    Drug use: No     Social History     Social History Narrative    Not on file        Objective:  Vital signs:  Vitals:    03/25/24 1035   BP: 162/91   Pulse: 87   Resp: 18   Temp: 96.9 °F (36.1 °C)   SpO2: 96%   Weight: 66.6 kg (146 lb 12.8 oz)   Height: 167.6 cm (66\")   PainSc: 0-No pain       Body mass index is 23.69 kg/m².  ECOG  (0) Fully active, able to carry on all predisease performance without restriction    Physical Exam:   Physical Exam  Constitutional:       Appearance: Normal appearance.   HENT:      Head: Normocephalic and atraumatic.      Mouth/Throat:      Mouth: Mucous membranes are dry.   Eyes:      Extraocular Movements: Extraocular movements intact.      Pupils: Pupils are equal, round, and reactive to light.   Cardiovascular:      Rate and Rhythm: Normal rate and regular rhythm.      " "Pulses: Normal pulses.      Heart sounds: Normal heart sounds. No murmur heard.  Pulmonary:      Effort: Pulmonary effort is normal.      Breath sounds: Normal breath sounds.   Abdominal:      General: There is no distension.      Palpations: Abdomen is soft. There is no mass.      Tenderness: There is no abdominal tenderness.   Musculoskeletal:         General: Normal range of motion.      Cervical back: Normal range of motion and neck supple.   Skin:     General: Skin is warm.   Neurological:      General: No focal deficit present.      Mental Status: She is alert.   Psychiatric:         Mood and Affect: Mood normal.       Lab Results - Last 18 Months   Lab Units 03/18/24  1006 10/02/23  0905 09/12/23  1044   WBC 10*3/mm3 5.80 5.93 4.61   HEMOGLOBIN g/dL 14.2 14.7 13.8   HEMATOCRIT % 44.2 45.7 42.8   PLATELETS 10*3/mm3 229 217 149   MCV fL 97.4* 93.6 96.6     Lab Results - Last 18 Months   Lab Units 02/07/23  1029   SODIUM mmol/L 140   POTASSIUM mmol/L 5.1   CHLORIDE mmol/L 101   TOTAL CO2 mmol/L 26   BUN mg/dL 19   CREATININE mg/dL 0.83   CALCIUM mg/dL 9.7   BILIRUBIN mg/dL 0.4   ALK PHOS U/L 72   ALT (SGPT) U/L 19   AST (SGOT) U/L 34       Lab Results   Component Value Date    GLUCOSE 98 04/16/2022    BUN 19 02/07/2023    CREATININE 0.83 02/07/2023    EGFRIFNONA 58 (L) 12/03/2019    EGFRIFAFRI >60 02/07/2023    BCR 23.1 02/07/2023    K 5.1 02/07/2023    CO2 26 02/07/2023    CALCIUM 9.7 02/07/2023    ALBUMIN 4.5 02/07/2023    LABIL2 1.6 02/07/2023    AST 34 02/07/2023    ALT 19 02/07/2023       No results for input(s): \"APTT\", \"INR\", \"PTT\" in the last 64433 hours.      No results found for: \"IRON\", \"TIBC\", \"FERRITIN\"    No results found for: \"FOLATE\"    No results found for: \"OCCULTBLD\"    No results found for: \"RETICCTPCT\"  Lab Results   Component Value Date    JIPXNBBZ86 534 04/06/2022     No results found for: \"SPEP\", \"UPEP\"  No results found for: \"LDH\", \"URICACID\"  No results found for: \"HUNG\", \"RF\", " "\"SEDRATE\"  No results found for: \"FIBRINOGEN\", \"HAPTOGLOBIN\"  Lab Results   Component Value Date    PTT 26.2 (L) 04/15/2022    INR 1.43 (H) 04/15/2022     No results found for: \"\"  No results found for: \"CEA\"  No components found for: \"CA-19-9\"  No results found for: \"PSA\"  No results found for: \"SEDRATE\"     Assessment & Plan     Assessment:    Patient is 88-year-old female with unprovoked venous thromboembolism with right leg DVT     Venous thromboembolism  Unprovoked, ideally would need indefinite anticoagulation  Would recommend at least 3 to 6 months of full dose anticoagulation with Eliquis  She has had immobility as a potential cause, this has improved now.  EED1OMP was low with no more symptoms eliquis was stopped  Now ddimer is slightly elevated but no s/s of dvt,   We discussed reinitiation of eliquis at low dose. She is concerned about being on anticoagulation  We started aspirin, but has had increased hemorrhoid issues, bleeding some more on wiping. Has had increased bruising.  Discussed holding aspirin for a week. She wants to hold for now understands risks of rethrombosis. She will restart in a week with improvement in symptoms.  Will see as needed.   Increased ddimer is likely not related to DVT, since this has been chronically elevated    Neuropathy  Likely related to sciatica.  Improved.    Right leg pain,   Has been seen by Dr. Davion house now      Thank you very much for providing the opportunity to participate in this patient’s care. Please do not hesitate to call if there are any other questions.     Luisa Land MD 03/25/24     "

## 2024-03-25 ENCOUNTER — OFFICE VISIT (OUTPATIENT)
Dept: ONCOLOGY | Facility: CLINIC | Age: 89
End: 2024-03-25
Payer: MEDICARE

## 2024-03-25 VITALS
TEMPERATURE: 96.9 F | OXYGEN SATURATION: 96 % | HEART RATE: 87 BPM | HEIGHT: 66 IN | RESPIRATION RATE: 18 BRPM | BODY MASS INDEX: 23.59 KG/M2 | DIASTOLIC BLOOD PRESSURE: 91 MMHG | WEIGHT: 146.8 LBS | SYSTOLIC BLOOD PRESSURE: 162 MMHG

## 2024-03-25 DIAGNOSIS — R60.0 BILATERAL LEG EDEMA: ICD-10-CM

## 2024-03-25 DIAGNOSIS — I82.461 ACUTE DEEP VEIN THROMBOSIS (DVT) OF CALF MUSCLE VEIN OF RIGHT LOWER EXTREMITY: Primary | ICD-10-CM

## 2024-03-25 PROCEDURE — 1159F MED LIST DOCD IN RCRD: CPT | Performed by: INTERNAL MEDICINE

## 2024-03-25 PROCEDURE — 1160F RVW MEDS BY RX/DR IN RCRD: CPT | Performed by: INTERNAL MEDICINE

## 2024-03-25 PROCEDURE — 99203 OFFICE O/P NEW LOW 30 MIN: CPT | Performed by: INTERNAL MEDICINE

## 2024-05-30 ENCOUNTER — OFFICE (AMBULATORY)
Dept: URBAN - METROPOLITAN AREA CLINIC 64 | Facility: CLINIC | Age: 89
End: 2024-05-30

## 2024-05-30 VITALS
WEIGHT: 147 LBS | HEART RATE: 71 BPM | DIASTOLIC BLOOD PRESSURE: 80 MMHG | SYSTOLIC BLOOD PRESSURE: 134 MMHG | HEIGHT: 66 IN

## 2024-05-30 DIAGNOSIS — R20.8 OTHER DISTURBANCES OF SKIN SENSATION: ICD-10-CM

## 2024-05-30 DIAGNOSIS — R19.4 CHANGE IN BOWEL HABIT: ICD-10-CM

## 2024-05-30 PROCEDURE — 99202 OFFICE O/P NEW SF 15 MIN: CPT | Performed by: NURSE PRACTITIONER

## 2024-09-26 ENCOUNTER — OFFICE (AMBULATORY)
Age: 89
End: 2024-09-26
Payer: COMMERCIAL

## 2024-09-26 ENCOUNTER — OFFICE (AMBULATORY)
Dept: URBAN - METROPOLITAN AREA CLINIC 64 | Facility: CLINIC | Age: 89
End: 2024-09-26
Payer: COMMERCIAL

## 2024-09-26 VITALS
HEIGHT: 66 IN | SYSTOLIC BLOOD PRESSURE: 141 MMHG | SYSTOLIC BLOOD PRESSURE: 149 MMHG | SYSTOLIC BLOOD PRESSURE: 149 MMHG | SYSTOLIC BLOOD PRESSURE: 149 MMHG | HEART RATE: 80 BPM | HEART RATE: 80 BPM | SYSTOLIC BLOOD PRESSURE: 149 MMHG | DIASTOLIC BLOOD PRESSURE: 101 MMHG | SYSTOLIC BLOOD PRESSURE: 141 MMHG | SYSTOLIC BLOOD PRESSURE: 141 MMHG | DIASTOLIC BLOOD PRESSURE: 101 MMHG | WEIGHT: 149 LBS | HEART RATE: 80 BPM | SYSTOLIC BLOOD PRESSURE: 141 MMHG | SYSTOLIC BLOOD PRESSURE: 149 MMHG | HEIGHT: 66 IN | DIASTOLIC BLOOD PRESSURE: 97 MMHG | DIASTOLIC BLOOD PRESSURE: 97 MMHG | HEIGHT: 66 IN | DIASTOLIC BLOOD PRESSURE: 101 MMHG | WEIGHT: 149 LBS | HEIGHT: 66 IN | DIASTOLIC BLOOD PRESSURE: 97 MMHG | HEIGHT: 66 IN | HEART RATE: 80 BPM | DIASTOLIC BLOOD PRESSURE: 97 MMHG | HEART RATE: 80 BPM | SYSTOLIC BLOOD PRESSURE: 141 MMHG | WEIGHT: 149 LBS | WEIGHT: 149 LBS | DIASTOLIC BLOOD PRESSURE: 97 MMHG | DIASTOLIC BLOOD PRESSURE: 101 MMHG | WEIGHT: 149 LBS | DIASTOLIC BLOOD PRESSURE: 101 MMHG | WEIGHT: 149 LBS | HEIGHT: 66 IN | DIASTOLIC BLOOD PRESSURE: 101 MMHG | HEART RATE: 80 BPM | SYSTOLIC BLOOD PRESSURE: 141 MMHG | SYSTOLIC BLOOD PRESSURE: 149 MMHG | DIASTOLIC BLOOD PRESSURE: 97 MMHG | DIASTOLIC BLOOD PRESSURE: 101 MMHG | DIASTOLIC BLOOD PRESSURE: 97 MMHG | WEIGHT: 149 LBS | HEIGHT: 66 IN | SYSTOLIC BLOOD PRESSURE: 149 MMHG | HEART RATE: 80 BPM | SYSTOLIC BLOOD PRESSURE: 141 MMHG

## 2024-09-26 DIAGNOSIS — R19.4 CHANGE IN BOWEL HABIT: ICD-10-CM

## 2024-09-26 DIAGNOSIS — R20.8 OTHER DISTURBANCES OF SKIN SENSATION: ICD-10-CM

## 2024-09-26 PROCEDURE — 99212 OFFICE O/P EST SF 10 MIN: CPT | Performed by: NURSE PRACTITIONER

## 2024-10-23 ENCOUNTER — OFFICE VISIT (OUTPATIENT)
Age: 89
End: 2024-10-23
Payer: MEDICARE

## 2024-10-23 VITALS
WEIGHT: 146 LBS | DIASTOLIC BLOOD PRESSURE: 78 MMHG | BODY MASS INDEX: 23.46 KG/M2 | HEIGHT: 66 IN | SYSTOLIC BLOOD PRESSURE: 128 MMHG

## 2024-10-23 DIAGNOSIS — I82.90 VTE (VENOUS THROMBOEMBOLISM): Primary | ICD-10-CM

## 2024-10-23 DIAGNOSIS — R00.2 PALPITATIONS: ICD-10-CM

## 2024-10-23 DIAGNOSIS — I87.8 VENOUS STASIS: ICD-10-CM

## 2024-10-23 DIAGNOSIS — I87.2 VENOUS (PERIPHERAL) INSUFFICIENCY: ICD-10-CM

## 2024-10-23 NOTE — PROGRESS NOTES
Patient Name: Jaleesa Govea    MRN: [id number] Encounter Date: 10/23/2024      Consulting Service: Vascular Surgery    Referring Provider: No ref. provider found       CHIEF COMPLAINT:  Chief Complaint   Patient presents with    Follow-up     Hx of Blood Clots        Subjective    HPI: Jaleesa Govea is a 89 y.o. female is being seen for evaluation/management of complaints of venous insufficiency and lower extremity edema of bilateral lower extremity.   Symptoms include Ricardo symptoms: Edema/Swelling, Rash/Irritation, Pigmentation, Pain/Aches, Heaviness/Fullness, and Throbbing.  Patient describes the discomfort from the veins as affecting their daily life.   Patient has negative family history of the varicose veins and negative family history of DVT.  At this point time attempts at symptomatic control using elevation, compression and nonsteroidals have not been been attempted.  Prior prior venous interventions  include  none .    PAST MEDICAL HISTORY:   Past Medical History:   Diagnosis Date    Bilateral leg edema 7/26/2019    History of echocardiogram 07/30/2019    EF 57%, Mild MR/TR/AR/AR, Grade 2 diastolic dysfunction    Hypothyroidism     Palpitations 7/26/2019      PAST SURGICAL HISTORY: History reviewed. No pertinent surgical history.   FAMILY HISTORY:   Family History   Problem Relation Age of Onset    Coronary artery disease Brother       SOCIAL HISTORY:   Social History     Tobacco Use    Smoking status: Former    Smokeless tobacco: Never   Vaping Use    Vaping status: Never Used   Substance Use Topics    Alcohol use: No    Drug use: No      MEDICATIONS:   Current Outpatient Medications on File Prior to Visit   Medication Sig Dispense Refill    Ascorbic Acid 100 MG chewable tablet Chew 100 mg Daily.      aspirin 81 MG EC tablet Take 1 tablet by mouth Daily. 90 tablet 1    Calcium Carbonate 1500 (600 Ca) MG tablet Take 1 tablet by mouth Daily.      cholecalciferol (VITAMIN D3) 10 MCG (400 UNIT) tablet  "Take 1 tablet by mouth Daily.      D-MANNOSE PO Take  by mouth.      Flaxseed, Linseed, 1000 MG capsule Take  by mouth.      glucosamine-chondroitin 500-400 MG per tablet Take 1 tablet by mouth.      levothyroxine (SYNTHROID, LEVOTHROID) 50 MCG tablet Take 1 tablet by mouth Daily.  3    multivitamin (THERAGRAN) tablet tablet Take 1 tablet by mouth Daily.      naproxen sodium (ALEVE) 220 MG tablet        No current facility-administered medications on file prior to visit.       ALLERGIES: Sulfamethoxazole-trimethoprim, Trimethoprim, Cephalexin, Nitrofurantoin, Penicillins, Sulfa antibiotics, Morphine, and Warfarin       Objective   Vitals:    10/23/24 1136   BP: 128/78   Weight: 66.2 kg (146 lb)   Height: 167.6 cm (65.98\")     Body mass index is 23.58 kg/m².  BMI is within normal parameters. No other follow-up for BMI required.      PHYSICAL EXAM:   Physical Exam  Constitutional:       Appearance: Normal appearance.   HENT:      Head: Normocephalic and atraumatic.      Nose: Nose normal.   Eyes:      Extraocular Movements: Extraocular movements intact.      Pupils: Pupils are equal, round, and reactive to light.   Cardiovascular:      Rate and Rhythm: Normal rate.      Pulses: Normal pulses.      Heart sounds: Normal heart sounds.      Comments: Bilateral stasis injury right worse than left with truncal veins seen on the shins but no large truncal varicosities seen.  Swelling on the left greater than the right is mild today.  Pulmonary:      Effort: Pulmonary effort is normal.      Breath sounds: Normal breath sounds.   Abdominal:      General: Abdomen is flat. Bowel sounds are normal.      Palpations: Abdomen is soft.   Musculoskeletal:         General: Normal range of motion.      Cervical back: Normal range of motion and neck supple.      Right lower le+ Edema present.      Left lower le+ Edema present.   Skin:     General: Skin is warm and dry.   Neurological:      General: No focal deficit present.      " Mental Status: She is alert and oriented to person, place, and time. Mental status is at baseline.   Psychiatric:         Mood and Affect: Mood normal.         Thought Content: Thought content normal.          Result Review   LABS:    CBC          3/18/2024    10:06   CBC   WBC 5.80    RBC 4.54    Hemoglobin 14.2    Hematocrit 44.2    MCV 97.4    MCH 31.3    MCHC 32.1    RDW 13.8    Platelets 229              A1C Last 3 Results          6/11/2024    10:16   HGBA1C Last 3 Results   Hemoglobin A1C 5.8          Details          This result is from an external source.                Results Review:       I reviewed the patient's new clinical results.    The following radiologic or non-invasive studies have been reviewed by me: Scan last year was negative for recurrent DVT.  Patient's history of right calf vein thrombosis is remote  No results found for this or any previous visit from the past 365 days.     No radiology results for the last 30 days.                ASSESSMENT/PLAN:   Diagnoses and all orders for this visit:    1. VTE (venous thromboembolism) (Primary)  -     Venous w Reflux Lower Extremity - Bilateral CAR; Future    2. Venous stasis  -     Venous w Reflux Lower Extremity - Bilateral CAR; Future    3. Venous (peripheral) insufficiency  -     Venous w Reflux Lower Extremity - Bilateral CAR; Future    4. Palpitations       89 y.o. female with history of prior calf vein thrombosis treated in the distant past and appears to been stable off of blood thinners at this time.  Unfortunately she has developed some swelling in the left foot that does not seem to be as significant as the complaints of bilateral tingling and pain in both legs.  She states that touching the legs gets very sensitive.  She is not currently on a statin so I do not believe that the source.  At this juncture she certainly has stigmata of venous injury with both sides having stasis injury seen.  She has been in and out of some compression in  the past and is going to try to get back into her compression now.  In the situation Jim I think that we are to pursue a venous mapping rather than a regular venous study as I do believe she has reflux on both sides and stasis injury that could be something that we should need to treat.  When she tries the compression stockings she has been noticed whether she gets improvement as this may be a reason for us to proceed with ablation depending on what we find.    I discussed the plan with the patient who is agreeable to the plan of care at this point. Thank you for this consult.   Follow Up  Return in about 3 months (around 1/23/2025).    Renan Ricardo MD   10/23/24

## 2025-02-13 ENCOUNTER — HOSPITAL ENCOUNTER (OUTPATIENT)
Dept: PET IMAGING | Facility: HOSPITAL | Age: 89
Discharge: HOME | End: 2025-02-13
Payer: MEDICARE

## 2025-02-13 ENCOUNTER — TRANSCRIBE ORDERS (OUTPATIENT)
Dept: WOMENS IMAGING | Facility: HOSPITAL | Age: 89
End: 2025-02-13
Payer: MEDICARE

## 2025-02-13 ENCOUNTER — APPOINTMENT (OUTPATIENT)
Dept: WOMENS IMAGING | Facility: HOSPITAL | Age: 89
End: 2025-02-13
Payer: MEDICARE

## 2025-02-13 DIAGNOSIS — Z78.0 POSTMENOPAUSAL STATUS (AGE-RELATED) (NATURAL): Primary | ICD-10-CM

## 2025-02-13 PROCEDURE — 77063 BREAST TOMOSYNTHESIS BI: CPT | Performed by: RADIOLOGY

## 2025-02-13 PROCEDURE — 77067 SCR MAMMO BI INCL CAD: CPT | Performed by: RADIOLOGY

## 2025-02-13 PROCEDURE — 77080 DXA BONE DENSITY AXIAL: CPT

## 2025-03-12 ENCOUNTER — OFFICE VISIT (OUTPATIENT)
Age: 89
End: 2025-03-12
Payer: MEDICARE

## 2025-03-12 ENCOUNTER — HOSPITAL ENCOUNTER (OUTPATIENT)
Facility: HOSPITAL | Age: 89
Discharge: HOME | End: 2025-03-12
Admitting: SURGERY
Payer: MEDICARE

## 2025-03-12 VITALS
SYSTOLIC BLOOD PRESSURE: 140 MMHG | HEIGHT: 66 IN | BODY MASS INDEX: 23.67 KG/M2 | DIASTOLIC BLOOD PRESSURE: 78 MMHG | WEIGHT: 147.3 LBS

## 2025-03-12 DIAGNOSIS — I82.90 VTE (VENOUS THROMBOEMBOLISM): ICD-10-CM

## 2025-03-12 DIAGNOSIS — I87.2 VENOUS (PERIPHERAL) INSUFFICIENCY: Primary | ICD-10-CM

## 2025-03-12 DIAGNOSIS — I87.8 VENOUS STASIS: ICD-10-CM

## 2025-03-12 DIAGNOSIS — R60.0 BILATERAL LEG EDEMA: ICD-10-CM

## 2025-03-12 DIAGNOSIS — I87.009 POST-PHLEBITIC SYNDROME: ICD-10-CM

## 2025-03-12 DIAGNOSIS — I87.2 VENOUS (PERIPHERAL) INSUFFICIENCY: ICD-10-CM

## 2025-03-12 LAB
BH CV LEFT LOWER VAS AA GSV TRANS DIAMETER: 0.3 CM
BH CV LEFT LOWER VAS EXT ILIAC REFLUX COLOR FLOW TIME: 1.27 SEC
BH CV LEFT LOWER VAS GSV KNEE TRANSVERSE DIAMETER: 0.4 CM
BH CV LEFT LOWER VAS GSV PROX TRANSVERSE DIAMETER: 0.6 CM
BH CV LEFT LOWER VAS POPLITEAL REFLUX TIME: 1.72 SEC
BH CV LEFT LOWER VAS SAPHENOFEMORAL JUNCTION TRANSVERSE DIAMETER: 0.5 CM
BH CV LEFT LOWER VAS SSV MID CALF TRANS DIAMETER: 0.4 CM
BH CV LOW VAS LEFT EXTERNAL ILIAC AUGMENT: NORMAL
BH CV LOW VAS LEFT EXTERNAL ILIAC COMPRESS: NORMAL
BH CV LOW VAS LEFT LESSER SAPH VESSEL: 1
BH CV LOW VAS RIGHT GASTROC NOT VIS SCRIPTING: 1
BH CV LOW VAS RIGHT PERONEAL NOT VIS SCRIPTING: 1
BH CV LOW VAS RIGHT POP NOT VIS SCRIPTING: 1
BH CV LOWER VAS LEFT GSV DIST THIGH COMPRESSIBILTY: NORMAL
BH CV LOWER VAS LEFT GSV MID CALF COMPRESSIBILTY: NORMAL
BH CV LOWER VAS LEFT GSV MID THIGH COMPRESSIBILTY: NORMAL
BH CV LOWER VAS RIGHT GSV DIST THIGH COMPRESSIBILTY: NORMAL
BH CV LOWER VAS RIGHT GSV MID CALF COMPRESSIBILTY: NORMAL
BH CV LOWER VAS RIGHT GSV MID THIGH COMPRESSIBILTY: NORMAL
BH CV LOWER VASCULAR LEFT COMMON FEMORAL AUGMENT: NORMAL
BH CV LOWER VASCULAR LEFT COMMON FEMORAL COMPETENT: NORMAL
BH CV LOWER VASCULAR LEFT COMMON FEMORAL COMPRESS: NORMAL
BH CV LOWER VASCULAR LEFT COMMON FEMORAL PHASIC: NORMAL
BH CV LOWER VASCULAR LEFT COMMON FEMORAL SPONT: NORMAL
BH CV LOWER VASCULAR LEFT DISTAL FEMORAL COMPRESS: NORMAL
BH CV LOWER VASCULAR LEFT EXTERNAL ILIAC COMPETENT: NORMAL
BH CV LOWER VASCULAR LEFT EXTERNAL ILIAC PHASIC: NORMAL
BH CV LOWER VASCULAR LEFT EXTERNAL ILIAC SPONT: NORMAL
BH CV LOWER VASCULAR LEFT GASTRONEMIUS COMPRESS: NORMAL
BH CV LOWER VASCULAR LEFT GREATER SAPH AK COMPETENT: NORMAL
BH CV LOWER VASCULAR LEFT GREATER SAPH AK COMPRESS: NORMAL
BH CV LOWER VASCULAR LEFT GREATER SAPH BK COMPRESS: NORMAL
BH CV LOWER VASCULAR LEFT GSV DIST THIGH COMPETENT: NORMAL
BH CV LOWER VASCULAR LEFT LESSER SAPH COMPRESS: NORMAL
BH CV LOWER VASCULAR LEFT LESSER SAPH THROMBUS: NORMAL
BH CV LOWER VASCULAR LEFT MID FEMORAL AUGMENT: NORMAL
BH CV LOWER VASCULAR LEFT MID FEMORAL COMPETENT: NORMAL
BH CV LOWER VASCULAR LEFT MID FEMORAL COMPRESS: NORMAL
BH CV LOWER VASCULAR LEFT PERONEAL AUGMENT: NORMAL
BH CV LOWER VASCULAR LEFT PERONEAL COMPRESS: NORMAL
BH CV LOWER VASCULAR LEFT POPLITEAL AUGMENT: NORMAL
BH CV LOWER VASCULAR LEFT POPLITEAL COMPETENT: NORMAL
BH CV LOWER VASCULAR LEFT POPLITEAL COMPRESS: NORMAL
BH CV LOWER VASCULAR LEFT POSTERIOR TIBIAL AUGMENT: NORMAL
BH CV LOWER VASCULAR LEFT POSTERIOR TIBIAL COMPRESS: NORMAL
BH CV LOWER VASCULAR LEFT PROFUNDA FEMORAL AUGMENT: NORMAL
BH CV LOWER VASCULAR LEFT PROFUNDA FEMORAL COMPETENT: NORMAL
BH CV LOWER VASCULAR LEFT PROFUNDA FEMORAL COMPRESS: NORMAL
BH CV LOWER VASCULAR LEFT PROFUNDA FEMORAL PHASIC: NORMAL
BH CV LOWER VASCULAR LEFT PROFUNDA FEMORAL SPONT: NORMAL
BH CV LOWER VASCULAR LEFT PROXIMAL FEMORAL AUGMENT: NORMAL
BH CV LOWER VASCULAR LEFT PROXIMAL FEMORAL COMPETENT: NORMAL
BH CV LOWER VASCULAR LEFT PROXIMAL FEMORAL COMPRESS: NORMAL
BH CV LOWER VASCULAR LEFT PROXIMAL FEMORAL PHASIC: NORMAL
BH CV LOWER VASCULAR LEFT PROXIMAL FEMORAL SPONT: NORMAL
BH CV LOWER VASCULAR LEFT SAPHENOFEMORAL JUNCTION AUGMENT: NORMAL
BH CV LOWER VASCULAR LEFT SAPHENOFEMORAL JUNCTION COMPETENT: NORMAL
BH CV LOWER VASCULAR LEFT SAPHENOFEMORAL JUNCTION COMPRESS: NORMAL
BH CV LOWER VASCULAR LEFT SAPHENOFEMORAL JUNCTION PHASIC: NORMAL
BH CV LOWER VASCULAR LEFT SAPHENOFEMORAL JUNCTION SPONT: NORMAL
BH CV LOWER VASCULAR LEFT SAPHENOPOP JX PHASIC: NORMAL
BH CV LOWER VASCULAR LEFT SSV MID CALF COMPETENT: NORMAL
BH CV LOWER VASCULAR LEFT SSV MID CALF COMPRESS: NORMAL
BH CV LOWER VASCULAR RIGHT COMMON FEMORAL AUGMENT: NORMAL
BH CV LOWER VASCULAR RIGHT COMMON FEMORAL COMPETENT: NORMAL
BH CV LOWER VASCULAR RIGHT COMMON FEMORAL COMPRESS: NORMAL
BH CV LOWER VASCULAR RIGHT COMMON FEMORAL PHASIC: NORMAL
BH CV LOWER VASCULAR RIGHT COMMON FEMORAL SPONT: NORMAL
BH CV LOWER VASCULAR RIGHT DISTAL FEMORAL COMPRESS: NORMAL
BH CV LOWER VASCULAR RIGHT EXTERNAL ILIAC AUGMENT: NORMAL
BH CV LOWER VASCULAR RIGHT EXTERNAL ILIAC COMPETENT: NORMAL
BH CV LOWER VASCULAR RIGHT EXTERNAL ILIAC COMPRESS: NORMAL
BH CV LOWER VASCULAR RIGHT EXTERNAL ILIAC PHASIC: NORMAL
BH CV LOWER VASCULAR RIGHT EXTERNAL ILIAC SPONT: NORMAL
BH CV LOWER VASCULAR RIGHT GASTRONEMIUS COMPRESS: NORMAL
BH CV LOWER VASCULAR RIGHT GASTRONEMIUS THROMBUS: NORMAL
BH CV LOWER VASCULAR RIGHT GREATER SAPH AK COMPETENT: NORMAL
BH CV LOWER VASCULAR RIGHT GREATER SAPH BK COMPRESS: NORMAL
BH CV LOWER VASCULAR RIGHT GSV DIST THIGH COMPETENT: NORMAL
BH CV LOWER VASCULAR RIGHT LESSER SAPH COMPETENT: NORMAL
BH CV LOWER VASCULAR RIGHT LESSER SAPH COMPRESS: NORMAL
BH CV LOWER VASCULAR RIGHT MID FEMORAL AUGMENT: NORMAL
BH CV LOWER VASCULAR RIGHT MID FEMORAL COMPETENT: NORMAL
BH CV LOWER VASCULAR RIGHT MID FEMORAL COMPRESS: NORMAL
BH CV LOWER VASCULAR RIGHT PERONEAL AUGMENT: NORMAL
BH CV LOWER VASCULAR RIGHT PERONEAL COMPRESS: NORMAL
BH CV LOWER VASCULAR RIGHT PERONEAL THROMBUS: NORMAL
BH CV LOWER VASCULAR RIGHT POPLITEAL AUGMENT: NORMAL
BH CV LOWER VASCULAR RIGHT POPLITEAL COMPETENT: NORMAL
BH CV LOWER VASCULAR RIGHT POPLITEAL COMPRESS: NORMAL
BH CV LOWER VASCULAR RIGHT POPLITEAL THROMBUS: NORMAL
BH CV LOWER VASCULAR RIGHT POSTERIOR TIBIAL AUGMENT: NORMAL
BH CV LOWER VASCULAR RIGHT POSTERIOR TIBIAL COMPRESS: NORMAL
BH CV LOWER VASCULAR RIGHT PROFUNDA FEMORAL AUGMENT: NORMAL
BH CV LOWER VASCULAR RIGHT PROFUNDA FEMORAL COMPETENT: NORMAL
BH CV LOWER VASCULAR RIGHT PROFUNDA FEMORAL COMPRESS: NORMAL
BH CV LOWER VASCULAR RIGHT PROFUNDA FEMORAL PHASIC: NORMAL
BH CV LOWER VASCULAR RIGHT PROFUNDA FEMORAL SPONT: NORMAL
BH CV LOWER VASCULAR RIGHT PROXIMAL FEMORAL AUGMENT: NORMAL
BH CV LOWER VASCULAR RIGHT PROXIMAL FEMORAL COMPETENT: NORMAL
BH CV LOWER VASCULAR RIGHT PROXIMAL FEMORAL COMPRESS: NORMAL
BH CV LOWER VASCULAR RIGHT PROXIMAL FEMORAL PHASIC: NORMAL
BH CV LOWER VASCULAR RIGHT PROXIMAL FEMORAL SPONT: NORMAL
BH CV LOWER VASCULAR RIGHT SAPHENOFEMORAL JUNCTION AUGMENT: NORMAL
BH CV LOWER VASCULAR RIGHT SAPHENOFEMORAL JUNCTION COMPETENT: NORMAL
BH CV LOWER VASCULAR RIGHT SAPHENOFEMORAL JUNCTION COMPRESS: NORMAL
BH CV LOWER VASCULAR RIGHT SAPHENOFEMORAL JUNCTION PHASIC: NORMAL
BH CV LOWER VASCULAR RIGHT SAPHENOFEMORAL JUNCTION SPONT: NORMAL
BH CV LOWER VASCULAR RIGHT SSV MID CALF COMPETENT: NORMAL
BH CV LOWER VASCULAR RIGHT SSV MID CALF COMPRESS: NORMAL
BH CV RIGHT LOWER VAS AA GSV TRANS DIAMETER: 0.3 CM
BH CV RIGHT LOWER VAS COMMON FEMORAL REFLUX COLOR FLOW TIME: 1.49 SEC
BH CV RIGHT LOWER VAS EXT ILIAC REFLUX COLOR FLOW TIME: 1.54 SEC
BH CV RIGHT LOWER VAS GSV KNEE TRANS DIAMETER: 0.4 CM
BH CV RIGHT LOWER VAS GSV PROX THIGH TRANS DIAMETER: 0.6 CM
BH CV RIGHT LOWER VAS POPLITEAL REFLUX TIME: 2.82 SEC
BH CV RIGHT LOWER VAS SAPHENOFEM JUNCTION TRANSVERSE DIAMETER: 0.6 CM
BH CV RIGHT LOWER VAS SSV PROX CALF TRANS DIAMETER: 0.3 CM
BH CV VAS RIGHT GSV PROXIMAL HIDDEN LRR COMPRESSIBILTY: NORMAL

## 2025-03-12 PROCEDURE — 93970 EXTREMITY STUDY: CPT

## 2025-03-12 RX ORDER — ROSUVASTATIN CALCIUM 10 MG/1
10 TABLET, COATED ORAL NIGHTLY
COMMUNITY

## 2025-03-12 RX ORDER — CYCLOSPORINE 0 G/ML
1 SOLUTION/ DROPS OPHTHALMIC; TOPICAL 2 TIMES DAILY
COMMUNITY
Start: 2025-02-24

## 2025-03-12 NOTE — PROGRESS NOTES
Patient Name: Jaleesa Govea    MRN: [id number] Encounter Date: 03/12/2025      Consulting Service: Vascular Surgery    Referring Provider: No ref. provider found       CHIEF COMPLAINT:  Chief Complaint   Patient presents with    VTE (venous thromboembolism)     Class 1 mapping       Subjective    HPI: Jaleesa Govea is a 89 y.o. female is being seen for evaluation/management of follow up for vein mapping of bilateral lower extremity.   Patient has been compliant with medical grade compression stocking use as well as elevation.   Despite best medical therapy symptoms persist.  At this point in time I discussed with the patient the options for intervention versus continued medical therapy.  Based on current anatomy and covered endovenous options I have recommended  continued compression .    While the patient was then I discussed in detail at length the procedure itself as well as the risk benefits and complications thereof.  We discussed deep vein thrombosis and associated pulmonary emboli.  Patient understands and will schedule at their convenience.      PAST MEDICAL HISTORY:   Past Medical History:   Diagnosis Date    Bilateral leg edema 7/26/2019    History of echocardiogram 07/30/2019    EF 57%, Mild MR/TR/AR/AR, Grade 2 diastolic dysfunction    Hypothyroidism     Palpitations 7/26/2019      PAST SURGICAL HISTORY: History reviewed. No pertinent surgical history.   FAMILY HISTORY:   Family History   Problem Relation Age of Onset    Coronary artery disease Brother       SOCIAL HISTORY:   Social History     Tobacco Use    Smoking status: Former     Passive exposure: Past    Smokeless tobacco: Never   Vaping Use    Vaping status: Never Used   Substance Use Topics    Alcohol use: No    Drug use: No      MEDICATIONS:   Current Outpatient Medications on File Prior to Visit   Medication Sig Dispense Refill    Cequa 0.09 % solution Administer 1 drop to both eyes 2 (Two) Times a Day.      Flaxseed, Linseed, 1000 MG  "capsule Take  by mouth.      glucosamine-chondroitin 500-400 MG per tablet Take 1 tablet by mouth.      levothyroxine (SYNTHROID, LEVOTHROID) 50 MCG tablet Take 1 tablet by mouth Daily.  3    multivitamin (THERAGRAN) tablet tablet Take 1 tablet by mouth Daily.      rosuvastatin (CRESTOR) 10 MG tablet Take 1 tablet by mouth Every Night.      Ascorbic Acid 100 MG chewable tablet Chew 100 mg Daily.      aspirin 81 MG EC tablet Take 1 tablet by mouth Daily. 90 tablet 1    Calcium Carbonate 1500 (600 Ca) MG tablet Take 1 tablet by mouth Daily.      cholecalciferol (VITAMIN D3) 10 MCG (400 UNIT) tablet Take 1 tablet by mouth Daily.      D-MANNOSE PO Take  by mouth.      naproxen sodium (ALEVE) 220 MG tablet        No current facility-administered medications on file prior to visit.       ALLERGIES: Sulfamethoxazole-trimethoprim, Trimethoprim, Cephalexin, Nitrofurantoin, Penicillins, Sulfa antibiotics, Morphine, and Warfarin       Objective   Vitals:    03/12/25 1055   BP: 140/78   BP Location: Left arm   Weight: 66.8 kg (147 lb 4.8 oz)   Height: 167.6 cm (65.98\")     Body mass index is 23.79 kg/m².  BMI is within normal parameters. No other follow-up for BMI required.      PHYSICAL EXAM:   Physical Exam  Constitutional:       Appearance: Normal appearance.   HENT:      Head: Normocephalic and atraumatic.      Nose: Nose normal.   Eyes:      Extraocular Movements: Extraocular movements intact.      Pupils: Pupils are equal, round, and reactive to light.   Cardiovascular:      Rate and Rhythm: Normal rate.      Pulses: Normal pulses.      Heart sounds: Normal heart sounds.   Pulmonary:      Effort: Pulmonary effort is normal.      Breath sounds: Normal breath sounds.   Abdominal:      General: Abdomen is flat. Bowel sounds are normal.      Palpations: Abdomen is soft.   Musculoskeletal:         General: Normal range of motion.      Cervical back: Normal range of motion and neck supple.      Right lower leg: No edema.      " Left lower leg: No edema.   Skin:     General: Skin is warm and dry.   Neurological:      General: No focal deficit present.      Mental Status: She is alert and oriented to person, place, and time. Mental status is at baseline.   Psychiatric:         Mood and Affect: Mood normal.         Thought Content: Thought content normal.          Result Review   LABS:    CBC          3/18/2024    10:06   CBC   WBC 5.80    RBC 4.54    Hemoglobin 14.2    Hematocrit 44.2    MCV 97.4    MCH 31.3    MCHC 32.1    RDW 13.8    Platelets 229              A1C Last 3 Results          6/11/2024    10:16   HGBA1C Last 3 Results   Hemoglobin A1C 5.8          Details          This result is from an external source.                Results Review:       I reviewed the patient's new clinical results.    The following radiologic or non-invasive studies have been reviewed by me: Bilateral lower extremity mapping reviewed with chronic thrombus with recanalization of the tibials and right popliteal and otherwise really negative superficial reflux and moderate left deep vein reflux and moderate to severe right.  She is doing very well with the stockings above findings on mapping.  She is doing very well with her compression stocking use  No results found for this or any previous visit from the past 365 days.     DEXA Bone Density Axial  Result Date: 2/13/2025  Impression: Osteopenia. Based upon the National Osteoporosis Foundation Guide, patient's FRAX score does meet criteria for pharmacologic treatment to prevent Osteoporosis.  Individual treatment decisions should be made based upon each patient's full clinical history  and risk factors. Report dictated by: Delmer Layne DNP  I have personally reviewed this case and agree with the findings above: Electronically Signed: Rayshawn Simons MD  2/13/2025 1:59 PM EST  Workstation ID: [id number]                  ASSESSMENT/PLAN:   Diagnoses and all orders for this visit:    1. Venous (peripheral)  insufficiency (Primary)    2. Venous stasis    3. VTE (venous thromboembolism)    4. Bilateral leg edema    5. Post-phlebitic syndrome       89 y.o. female with the above findings on her mapping.  She is doing very well with compression stocking use and I think this is our recommendation for continued therapy.  We would not recommend ablation as her superficial systems are competent and there is really nothing to be done other than compression for deep systems.  We discussed at length the option to go on long-term blood thinners or to continue with her current status which is using intermittent baby aspirin.  Currently there is no indication for long-term low-dose blood thinner such as Eliquis 2.5 mg dosing as she is a single event VTE that was provoked and has good recanalization and no significant proximal clot.  He has no immediate travel concerns but if she were to go on a long trip or get immobilized at home she could consider low-dose blood thinners at that time just around those events.    I discussed the plan with the patient and family who are agreeable to the plan of care at this point. Thank you for this consult.   Follow Up  Return if symptoms worsen or fail to improve.    Renan Ricardo MD   03/12/25

## 2025-06-09 NOTE — TELEPHONE ENCOUNTER
Caller: Jaleesa Govea    Relationship: Self        What was the call regarding:     SEEN DR BROWN IN AUGUST AND HE WAS CHANGING ELIQUIS SCRIPT FROM 5MG TO 2.5 MG WANTING TO KNOW IF WAS CALLED IN     ADVISED THE 2.5MG ELIQUIS WAS SENT IN ON 08/03 AND SENT TO     [id number], [place] - [address] AT [facility] & [address]              "ACNE INSTRUCTIONS  Use hydroquinone bleaching cream to dark spots only each morning.  Use with a sunscreen with SPF 30.    Use tretinoin cream (pea-sized amount) to entire face at bedtime. May cause irritation, start using every third night and gradually increase to every night.  You may also apply a non-comedogenic moisturizer prior to applying the tretinoin to help reduce the risk of irritation and dryness.  Use a gentle cleanser twice daily such as CeraVe, Cetaphil, or Elta MD Gentle Foaming Cleanser.  Use CeraVe or Cetaphil lotion or Elta MD AM/PM therapy and use twice a day if dryness occurs.  All skin care products and cosmetics should have the label "non-comdeogenic" which means it will not clog your pores.        SHEER SUNSCREENS    Cetaphil Sheer Mineral Face Sunscreen SPF 50  CeraVe Sheer Hydrating Sunscreen SPF 30  Mary Brightening Moisturizer SPF 30  Umbra Sheer Physical Daily Defense SPF 30  Shiseido Ultimate Sun Protection Lotion WetForce SPF 50+  Supergoop! Unseen Sunscreen Broad Spectrum SPF 40  Neutrogena HydroBoost Water Gel Lotion SPF 50  Neutrogena Ultrasheer Face & Body Stick SPF 70    "